# Patient Record
Sex: FEMALE | Race: WHITE | NOT HISPANIC OR LATINO | Employment: FULL TIME | URBAN - METROPOLITAN AREA
[De-identification: names, ages, dates, MRNs, and addresses within clinical notes are randomized per-mention and may not be internally consistent; named-entity substitution may affect disease eponyms.]

---

## 2020-01-27 ENCOUNTER — TELEPHONE (OUTPATIENT)
Dept: OBSTETRICS AND GYNECOLOGY | Facility: CLINIC | Age: 40
End: 2020-01-27

## 2020-01-27 DIAGNOSIS — Z34.90 PREGNANCY: Primary | ICD-10-CM

## 2020-01-27 NOTE — TELEPHONE ENCOUNTER
Voicemail message left for pt to call back and schedule appt.     ----- Message from Alphonse Golden sent at 1/27/2020  9:18 AM CST -----  Contact:  MARCIANO ALVARADO [[id number]]  Can the clinic reply in [facility]:      Who Called:  MARCIANO ALVARADO [[id number]]      Date of Positive Preg Test: 01/24/2020      1st day of Last Menstrual Cycle:  11/28/2019     List Any Difficulties: no     What Number to Call Back:  [phone] (home)

## 2020-01-30 ENCOUNTER — OFFICE VISIT (OUTPATIENT)
Dept: OBSTETRICS AND GYNECOLOGY | Facility: CLINIC | Age: 40
End: 2020-01-30
Payer: MEDICAID

## 2020-01-30 ENCOUNTER — PROCEDURE VISIT (OUTPATIENT)
Dept: OBSTETRICS AND GYNECOLOGY | Facility: CLINIC | Age: 40
End: 2020-01-30
Attending: EMERGENCY MEDICINE
Payer: MEDICAID

## 2020-01-30 VITALS
SYSTOLIC BLOOD PRESSURE: 122 MMHG | DIASTOLIC BLOOD PRESSURE: 84 MMHG | BODY MASS INDEX: 22.83 KG/M2 | HEIGHT: 69 IN | WEIGHT: 154.13 LBS

## 2020-01-30 DIAGNOSIS — F19.91 HISTORY OF DRUG USE: ICD-10-CM

## 2020-01-30 DIAGNOSIS — F19.91 HISTORY OF DRUG USE DISORDER: ICD-10-CM

## 2020-01-30 DIAGNOSIS — Z33.2 SECOND TRIMESTER ABORTION: ICD-10-CM

## 2020-01-30 DIAGNOSIS — Z32.00 POSSIBLE PREGNANCY, NOT YET CONFIRMED: ICD-10-CM

## 2020-01-30 DIAGNOSIS — Z36.89 ESTABLISH GESTATIONAL AGE, ULTRASOUND: ICD-10-CM

## 2020-01-30 DIAGNOSIS — Z32.00 POSSIBLE PREGNANCY, NOT YET CONFIRMED: Primary | ICD-10-CM

## 2020-01-30 DIAGNOSIS — O09.529 ANTEPARTUM MULTIGRAVIDA OF ADVANCED MATERNAL AGE: ICD-10-CM

## 2020-01-30 DIAGNOSIS — R31.9 URINARY TRACT INFECTION WITH HEMATURIA, SITE UNSPECIFIED: ICD-10-CM

## 2020-01-30 DIAGNOSIS — Z34.90 PREGNANCY, UNSPECIFIED GESTATIONAL AGE: ICD-10-CM

## 2020-01-30 DIAGNOSIS — N39.0 URINARY TRACT INFECTION WITH HEMATURIA, SITE UNSPECIFIED: ICD-10-CM

## 2020-01-30 DIAGNOSIS — Z3A.14 14 WEEKS GESTATION OF PREGNANCY: ICD-10-CM

## 2020-01-30 DIAGNOSIS — O99.311 ALCOHOL USE AFFECTING PREGNANCY IN FIRST TRIMESTER: ICD-10-CM

## 2020-01-30 PROBLEM — O99.310 ALCOHOL USE AFFECTING PREGNANCY: Status: ACTIVE | Noted: 2020-01-30

## 2020-01-30 LAB
AMPHET+METHAMPHET UR QL: NEGATIVE
B-HCG UR QL: POSITIVE
BARBITURATES UR QL SCN>200 NG/ML: NEGATIVE
BENZODIAZ UR QL SCN>200 NG/ML: NEGATIVE
BZE UR QL SCN: NEGATIVE
CANNABINOIDS UR QL SCN: NEGATIVE
CREAT UR-MCNC: 55 MG/DL (ref 15–325)
CTP QC/QA: YES
ETHANOL UR-MCNC: <10 MG/DL
METHADONE UR QL SCN>300 NG/ML: NEGATIVE
OPIATES UR QL SCN: NEGATIVE
PCP UR QL SCN>25 NG/ML: NEGATIVE
TOXICOLOGY INFORMATION: NORMAL

## 2020-01-30 PROCEDURE — 80307 DRUG TEST PRSMV CHEM ANLYZR: CPT

## 2020-01-30 PROCEDURE — 87591 N.GONORRHOEAE DNA AMP PROB: CPT

## 2020-01-30 PROCEDURE — 99999 PR PBB SHADOW E&M-EST. PATIENT-LVL III: ICD-10-PCS | Mod: PBBFAC,,, | Performed by: ADVANCED PRACTICE MIDWIFE

## 2020-01-30 PROCEDURE — 99204 OFFICE O/P NEW MOD 45 MIN: CPT | Mod: S$PBB,TH,, | Performed by: ADVANCED PRACTICE MIDWIFE

## 2020-01-30 PROCEDURE — 76816 PR  US,PREGNANT UTERUS,F/U,TRANSABD APP: ICD-10-PCS | Mod: 26,S$PBB,, | Performed by: OBSTETRICS & GYNECOLOGY

## 2020-01-30 PROCEDURE — 87086 URINE CULTURE/COLONY COUNT: CPT

## 2020-01-30 PROCEDURE — 87088 URINE BACTERIA CULTURE: CPT

## 2020-01-30 PROCEDURE — 99999 PR PBB SHADOW E&M-EST. PATIENT-LVL III: CPT | Mod: PBBFAC,,, | Performed by: ADVANCED PRACTICE MIDWIFE

## 2020-01-30 PROCEDURE — 99204 PR OFFICE/OUTPT VISIT, NEW, LEVL IV, 45-59 MIN: ICD-10-PCS | Mod: S$PBB,TH,, | Performed by: ADVANCED PRACTICE MIDWIFE

## 2020-01-30 PROCEDURE — 99213 OFFICE O/P EST LOW 20 MIN: CPT | Mod: PBBFAC,25,TH | Performed by: ADVANCED PRACTICE MIDWIFE

## 2020-01-30 PROCEDURE — 81025 URINE PREGNANCY TEST: CPT | Mod: PBBFAC | Performed by: ADVANCED PRACTICE MIDWIFE

## 2020-01-30 PROCEDURE — 76816 OB US FOLLOW-UP PER FETUS: CPT | Mod: 26,S$PBB,, | Performed by: OBSTETRICS & GYNECOLOGY

## 2020-01-30 PROCEDURE — 76816 OB US FOLLOW-UP PER FETUS: CPT | Mod: PBBFAC | Performed by: OBSTETRICS & GYNECOLOGY

## 2020-01-30 RX ORDER — HYDROXYZINE HYDROCHLORIDE 25 MG/1
25 TABLET, FILM COATED ORAL 3 TIMES DAILY
COMMUNITY
End: 2020-02-12 | Stop reason: SDUPTHER

## 2020-01-30 RX ORDER — NITROFURANTOIN 25; 75 MG/1; MG/1
100 CAPSULE ORAL 2 TIMES DAILY
Qty: 14 CAPSULE | Refills: 0 | Status: SHIPPED | OUTPATIENT
Start: 2020-01-30 | End: 2020-02-06

## 2020-01-31 LAB
C TRACH DNA SPEC QL NAA+PROBE: NOT DETECTED
N GONORRHOEA DNA SPEC QL NAA+PROBE: NOT DETECTED

## 2020-02-01 LAB — BACTERIA UR CULT: ABNORMAL

## 2020-02-09 DIAGNOSIS — Z32.00 POSSIBLE PREGNANCY, NOT YET CONFIRMED: Primary | ICD-10-CM

## 2020-02-11 ENCOUNTER — TELEPHONE (OUTPATIENT)
Dept: OBSTETRICS AND GYNECOLOGY | Facility: HOSPITAL | Age: 40
End: 2020-02-11

## 2020-02-11 DIAGNOSIS — F99 INSOMNIA DUE TO OTHER MENTAL DISORDER: Primary | ICD-10-CM

## 2020-02-11 DIAGNOSIS — F51.05 INSOMNIA DUE TO OTHER MENTAL DISORDER: Primary | ICD-10-CM

## 2020-02-11 PROBLEM — F41.0 PANIC ATTACK: Status: ACTIVE | Noted: 2020-01-26

## 2020-02-11 PROBLEM — F41.9 ANXIETY: Status: ACTIVE | Noted: 2020-02-11

## 2020-02-11 RX ORDER — HYDROXYZINE PAMOATE 25 MG/1
25 CAPSULE ORAL EVERY 4 HOURS PRN
Qty: 8 CAPSULE | Refills: 0 | Status: SHIPPED | OUTPATIENT
Start: 2020-02-11 | End: 2020-02-12 | Stop reason: SDUPTHER

## 2020-02-11 NOTE — TELEPHONE ENCOUNTER
"Called Sonal in response to a message from her requesting a refill of Vistaril. Called to ask about reason why she has been taking Vistaril. She states that on 1/26/20 she had a panic attack because she was having so much difficulty sleeping due to anxiety and she was exhausted. She went to an urgent care and was prescribed Vistaril (40 pills for 5 days). She states that she only took the pills at night to help her sleep, so instead of lasting 5 days they lasted her until yesterday. At the urgent care visit on 1/26/20 she found out that she is pregnant. According to dating ultrasound on 1/30/20 her MARBELLA is 7/24/20 (making her 16w4d today).    She states that she has been experiencing anxiety since her friend committed suicide on August 28th, 2019. She states that she lost her job a few days after that, and that compounded her anxiety. She was not taking any anxiety medications until her urgent care visit on 1/26/20.    She states that she does not feel very anxious during the day, but at night time she feels anxious and therefore has difficulty falling asleep. When she has difficulty falling asleep, she starts to feel anxious about not getting enough sleep, and that makes her anxiety worse, which makes it harder to fall asleep. She denies suicidal ideation and denies homicidal ideation. When asked about SI/HI, she exclaimed, "Thank you for asking, I know you have to ask that, but NO! I would NEVER think of doing that!"     Recommended for patient to come in for clinic visit tomorrow. She states that at her first OB visit she was in a rush and had to leave before all information was covered. Tomorrow she would like to discuss genetic testing options, new OB bloodwork, and ways to help her sleep/decrease anxiety. She states that she will be here at 2:40pm with her  for her clinic visit tomorrow.    She will be out of town from February 21st-28th, and requests that no appointments be scheduled that week.    I told " her that I would prescribe more Vistaril so that it can help her sleep tonight, and will see her in the clinic tomorrow.

## 2020-02-12 ENCOUNTER — INITIAL PRENATAL (OUTPATIENT)
Dept: OBSTETRICS AND GYNECOLOGY | Facility: CLINIC | Age: 40
End: 2020-02-12
Payer: MEDICAID

## 2020-02-12 ENCOUNTER — HOSPITAL ENCOUNTER (OUTPATIENT)
Dept: RADIOLOGY | Facility: OTHER | Age: 40
Discharge: HOME | End: 2020-02-12
Attending: ADVANCED PRACTICE MIDWIFE
Payer: MEDICAID

## 2020-02-12 ENCOUNTER — PATIENT MESSAGE (OUTPATIENT)
Dept: OBSTETRICS AND GYNECOLOGY | Facility: CLINIC | Age: 40
End: 2020-02-12

## 2020-02-12 VITALS — WEIGHT: 156.5 LBS | BODY MASS INDEX: 23.11 KG/M2 | SYSTOLIC BLOOD PRESSURE: 114 MMHG | DIASTOLIC BLOOD PRESSURE: 78 MMHG

## 2020-02-12 DIAGNOSIS — Z34.90 PREGNANCY, UNSPECIFIED GESTATIONAL AGE: ICD-10-CM

## 2020-02-12 DIAGNOSIS — Z13.9 RISK AND FUNCTIONAL ASSESSMENT: ICD-10-CM

## 2020-02-12 DIAGNOSIS — E04.9 GOITER: ICD-10-CM

## 2020-02-12 DIAGNOSIS — Z34.90 PREGNANCY, UNSPECIFIED GESTATIONAL AGE: Primary | ICD-10-CM

## 2020-02-12 DIAGNOSIS — G47.00 INSOMNIA, UNSPECIFIED TYPE: ICD-10-CM

## 2020-02-12 DIAGNOSIS — Z32.00 POSSIBLE PREGNANCY, NOT YET CONFIRMED: ICD-10-CM

## 2020-02-12 DIAGNOSIS — F41.9 ANXIETY: ICD-10-CM

## 2020-02-12 PROCEDURE — 88175 CYTOPATH C/V AUTO FLUID REDO: CPT

## 2020-02-12 PROCEDURE — 76536 US EXAM OF HEAD AND NECK: CPT | Mod: 26,,, | Performed by: RADIOLOGY

## 2020-02-12 PROCEDURE — 99203 PR OFFICE/OUTPT VISIT, NEW, LEVL III, 30-44 MIN: ICD-10-PCS | Mod: TH,S$PBB,, | Performed by: ADVANCED PRACTICE MIDWIFE

## 2020-02-12 PROCEDURE — 76536 US EXAM OF HEAD AND NECK: CPT | Mod: TC

## 2020-02-12 PROCEDURE — 99212 OFFICE O/P EST SF 10 MIN: CPT | Mod: PBBFAC,25,TH | Performed by: ADVANCED PRACTICE MIDWIFE

## 2020-02-12 PROCEDURE — 76536 US THYROID: ICD-10-PCS | Mod: 26,,, | Performed by: RADIOLOGY

## 2020-02-12 PROCEDURE — 99999 PR PBB SHADOW E&M-EST. PATIENT-LVL II: ICD-10-PCS | Mod: PBBFAC,,, | Performed by: ADVANCED PRACTICE MIDWIFE

## 2020-02-12 PROCEDURE — 87624 HPV HI-RISK TYP POOLED RSLT: CPT

## 2020-02-12 PROCEDURE — 87086 URINE CULTURE/COLONY COUNT: CPT

## 2020-02-12 PROCEDURE — 99999 PR PBB SHADOW E&M-EST. PATIENT-LVL II: CPT | Mod: PBBFAC,,, | Performed by: ADVANCED PRACTICE MIDWIFE

## 2020-02-12 PROCEDURE — 99203 OFFICE O/P NEW LOW 30 MIN: CPT | Mod: TH,S$PBB,, | Performed by: ADVANCED PRACTICE MIDWIFE

## 2020-02-12 RX ORDER — HYDROXYZINE PAMOATE 50 MG/1
50 CAPSULE ORAL NIGHTLY PRN
Qty: 40 CAPSULE | Refills: 0 | Status: SHIPPED | OUTPATIENT
Start: 2020-02-12 | End: 2020-02-12

## 2020-02-12 RX ORDER — HYDROXYZINE PAMOATE 25 MG/1
25 CAPSULE ORAL NIGHTLY PRN
Qty: 40 CAPSULE | Refills: 0 | Status: SHIPPED | OUTPATIENT
Start: 2020-02-12 | End: 2020-03-23

## 2020-02-13 ENCOUNTER — LAB VISIT (OUTPATIENT)
Dept: LAB | Facility: OTHER | Age: 40
End: 2020-02-13
Attending: ADVANCED PRACTICE MIDWIFE
Payer: MEDICAID

## 2020-02-13 ENCOUNTER — TELEPHONE (OUTPATIENT)
Dept: OBSTETRICS AND GYNECOLOGY | Facility: CLINIC | Age: 40
End: 2020-02-13

## 2020-02-13 DIAGNOSIS — Z34.90 PREGNANCY, UNSPECIFIED GESTATIONAL AGE: ICD-10-CM

## 2020-02-13 LAB
ABO + RH BLD: NORMAL
ALBUMIN SERPL BCP-MCNC: 3 G/DL (ref 3.5–5.2)
ALP SERPL-CCNC: 58 U/L (ref 55–135)
ALT SERPL W/O P-5'-P-CCNC: 13 U/L (ref 10–44)
ANION GAP SERPL CALC-SCNC: 7 MMOL/L (ref 8–16)
AST SERPL-CCNC: 18 U/L (ref 10–40)
BACTERIA UR CULT: NO GROWTH
BASOPHILS # BLD AUTO: 0.04 K/UL (ref 0–0.2)
BASOPHILS NFR BLD: 0.6 % (ref 0–1.9)
BILIRUB SERPL-MCNC: 0.4 MG/DL (ref 0.1–1)
BLD GP AB SCN CELLS X3 SERPL QL: NORMAL
BUN SERPL-MCNC: 6 MG/DL (ref 6–20)
CALCIUM SERPL-MCNC: 9.5 MG/DL (ref 8.7–10.5)
CHLORIDE SERPL-SCNC: 107 MMOL/L (ref 95–110)
CO2 SERPL-SCNC: 23 MMOL/L (ref 23–29)
CREAT SERPL-MCNC: 0.7 MG/DL (ref 0.5–1.4)
DIFFERENTIAL METHOD: ABNORMAL
EOSINOPHIL # BLD AUTO: 0.1 K/UL (ref 0–0.5)
EOSINOPHIL NFR BLD: 1.6 % (ref 0–8)
ERYTHROCYTE [DISTWIDTH] IN BLOOD BY AUTOMATED COUNT: 11.2 % (ref 11.5–14.5)
EST. GFR  (AFRICAN AMERICAN): >60 ML/MIN/1.73 M^2
EST. GFR  (NON AFRICAN AMERICAN): >60 ML/MIN/1.73 M^2
GLUCOSE SERPL-MCNC: 80 MG/DL (ref 70–110)
HCT VFR BLD AUTO: 35 % (ref 37–48.5)
HGB BLD-MCNC: 11.2 G/DL (ref 12–16)
IMM GRANULOCYTES # BLD AUTO: 0.02 K/UL (ref 0–0.04)
IMM GRANULOCYTES NFR BLD AUTO: 0.3 % (ref 0–0.5)
LYMPHOCYTES # BLD AUTO: 1.4 K/UL (ref 1–4.8)
LYMPHOCYTES NFR BLD: 19.6 % (ref 18–48)
MCH RBC QN AUTO: 31.4 PG (ref 27–31)
MCHC RBC AUTO-ENTMCNC: 32 G/DL (ref 32–36)
MCV RBC AUTO: 98 FL (ref 82–98)
MONOCYTES # BLD AUTO: 0.5 K/UL (ref 0.3–1)
MONOCYTES NFR BLD: 7.4 % (ref 4–15)
NEUTROPHILS # BLD AUTO: 4.9 K/UL (ref 1.8–7.7)
NEUTROPHILS NFR BLD: 70.5 % (ref 38–73)
NRBC BLD-RTO: 0 /100 WBC
PLATELET # BLD AUTO: 338 K/UL (ref 150–350)
PMV BLD AUTO: 9.8 FL (ref 9.2–12.9)
POTASSIUM SERPL-SCNC: 3.8 MMOL/L (ref 3.5–5.1)
PROT SERPL-MCNC: 6.7 G/DL (ref 6–8.4)
RBC # BLD AUTO: 3.57 M/UL (ref 4–5.4)
SODIUM SERPL-SCNC: 137 MMOL/L (ref 136–145)
WBC # BLD AUTO: 7 K/UL (ref 3.9–12.7)

## 2020-02-13 PROCEDURE — 85025 COMPLETE CBC W/AUTO DIFF WBC: CPT

## 2020-02-13 PROCEDURE — 86592 SYPHILIS TEST NON-TREP QUAL: CPT

## 2020-02-13 PROCEDURE — 80053 COMPREHEN METABOLIC PANEL: CPT

## 2020-02-13 PROCEDURE — 86900 BLOOD TYPING SEROLOGIC ABO: CPT

## 2020-02-13 PROCEDURE — 86762 RUBELLA ANTIBODY: CPT

## 2020-02-13 PROCEDURE — 87340 HEPATITIS B SURFACE AG IA: CPT

## 2020-02-13 PROCEDURE — 86703 HIV-1/HIV-2 1 RESULT ANTBDY: CPT

## 2020-02-13 NOTE — TELEPHONE ENCOUNTER
Returned call to pt.  Left  message advising pt to return call to clinic        ----- Message from Altagracia Cano sent at 2/13/2020  9:27 AM CST -----  Contact: MARCIANO ALVARADO   Name of Who is Calling: MARCIANO ALVARADO        What is the request in detail: Patient is requesting a call from staff in regards to getting orders for bloodwork (LAB HAD TROUBLE PRINTING LABELS).....Please contact to further discuss and advise.     Can the clinic reply by [facility]: NO     What Number [facility] Back [facility]: [phone]

## 2020-02-14 LAB
HBV SURFACE AG SERPL QL IA: NEGATIVE
HIV 1+2 AB+HIV1 P24 AG SERPL QL IA: NEGATIVE
RPR SER QL: NORMAL
RUBV IGG SER-ACNC: 10.3 IU/ML
RUBV IGG SER-IMP: REACTIVE

## 2020-02-14 NOTE — PROGRESS NOTES
39 y.o.,  at 16w5d by 14 wk US    Complaints today: She has been having difficulty sleeping due to anxiety. She was prescribed Atarax in January at an urgent care after she had a panic attack (which she states was due to exhaustion). She states that the Atarax has been very helpful in allowing her to sleep. She is requesting a refill.     Feeling flutters/fetal movement.    Patient's last pap was about 1 year ago at an outside facility: result negative cytology, positive HPV. Will repeat pap today.    TW lbs     BMI  -- Discussed IOM recommended weight gain of:   Normal Weight 18.5-24.9  25-35   -- Discussed criteria for delivery at [facility] r/t excessive pre-preg weight or excessive weight gain:   Pre-pregnancy BMI over 40 or excess pregnancy weight gain defined as:   Pre-preg BMI 18.5-24.9;  Excess weight gain = > 53 pounds    ROS  OBSTETRICS:   Contractions No   Bleeding No   Loss of fluid No    GASTRO:   Nausea No   Vomiting No      OB History    Para Term  AB Living   5 1 1 0 2 1   SAB TAB Ectopic Multiple Live Births   1 0 0 0        # Outcome Date GA Lbr Ponce/2nd Weight Sex Delivery Anes PTL Lv   5 Current            4             3 AB            2 SAB            1 Term                Dating reviewed  Allergies and problem list reviewed and updated  Medical and surgical history reviewed  Prenatal labs reviewed and updated    PHYSICAL EXAM  /78   Wt 71 kg (156 lb 8.4 oz)   LMP 2019 (Within Weeks)   BMI 23.11 kg/m²     ROS:  Constitutional/Gen: Denies fevers, chills, malaise, or weight loss.   Psych: Denies depression, positive anxiety (patient states she is fine during the day, but anxiety keeps her awake at night if she does not have Atarax or Vistaril)  Eyes: Denies changes in vision or scotomata  Ears, nose, mouth, throat: Denies sinus tenderness, swelling, or dentition problems  CV/vasc: Denies heart palpitations or edema  Resp: Denies SOB or dyspnea  Breasts:  Denies mass, nipple discharge, or trauma.  GI: Denies constipation, diarrhea, or vomiting.  : Denies vaginal discharge, dysuria or pelvic pain.  MS: Denies weakness, soreness, or changes in ROM    OBJECTIVE:  /78   Wt 71 kg (156 lb 8.4 oz)   LMP 11/28/2019 (Within Weeks)   BMI 23.11 kg/m²   Constitutional/Gen: NAD, appears stated age, well groomed  Neck: supple, no masses, thyroid enlarged  Head: normocephalic  Skin: warm and dry w/o rash  Lung: normal resp effort, CTAB  Heart: normal HR, RRR   Back: negative CVAT  Breasts: bilaterally--no masses, tenderness, skin changes, or nipple discharge noted  Abdomen: soft, nontender, no masses, and bowel sounds normal, no enlargement  External genitalia: no lesions or discharge, normal hair distribution  Urethral meatus: normal size and location, no lesions or prolapse  Vagina: normal appearance, no lesions, no discharge, no evidence cystocele or rectocele.  Cervix: normal appearance, no discharge, no lesions, negative CMT  Uterus: nontender, mobile, approx 16 week size, contour, and position. FHTs via doppler 150bpm  Adnexa: no masses or tenderness  Anus/Perineum: normal appearance, with no lesions or discharge. Internal exam deferred.  Extremities: FROM, with no edema or tenderness.  Neurologic: A&O x 4, non-focal, cranial nerves 2-12 grossly intact  Psych: affect mildly anxious and without signs of mood, thought or memory difficulty appreciated. Patient denies suicidal or homicidal ideations.     ASSESSMENT AND PLAN    MARCIANO ALVARADO Problems (from 02/12/20 to present)     No problems associated with this episode.            Pt plans to complete [id number] today. Education regarding AFP screening today and pt accepts.    Anatomy ultrasound with MFM ordered/discussed.    Diagnoses and all orders for this visit:    Pregnancy, unspecified gestational age  -     Maternal Screen AFP (Single Marker); Future  -     TSH; Future  -     T4, free; Future  -     US Thyroid;  Future  -     Liquid-Based Pap Smear, Screening  -     HPV High Risk Genotypes, PCR  -     Urine culture  -     CBC auto differential; Future  -     Rubella Antibody, IgG; Future  -     Type & Screen; Future  -     Hepatitis B surface antigen; Future  -     RPR; Future  -     HIV 1/2 Ag/Ab (4th Gen); Future  -     COMPREHENSIVE METABOLIC PANEL; Future  -     US MFM Procedure (Viewpoint); Future    Goiter  -     TSH; Future  -     T4, free; Future  -     US Thyroid; Future    Anxiety  -     hydrOXYzine pamoate (VISTARIL) 25 MG Cap; Take 1 capsule (25 mg total) by mouth nightly as needed (For anxiety and difficulty sleeping).  - Patient denies suicidal or homicidal ideation. Confirmed that she has 24 hour CNM number and she agrees to call that or 911 immediately if needed.  - Patient states that anxiety is only a problem at night, and keeps her awake at night, but that vistaril has been working for her.  - Encouraged Sonal to seek continuous care with a psychiatric provider so she can be treated for anxiety and eventually wean off Vistaril. Sent list of psychiatric facilities that accept Medicaid. Patient agrees to establish care with a psychiatric provider and states that she sees the value in this.    Insomnia, unspecified type  -     hydrOXYzine pamoate (VISTARIL) 25 MG Cap; Take 1 capsule (25 mg total) by mouth nightly as needed (For anxiety and difficulty sleeping).    Birth Center Risk Assessment: 2 - Consultation with OB to develop plan of care (alcohol use in early preg before being aware of pregnancy)    Reviewed warning signs and pregnancy precautions, along with how/when to call. Confirmed that she has 24 hour CNM number and she agrees to call that or 911 immediately if needed.    Follow up: 4 wks, call or present sooner prn.

## 2020-02-15 PROBLEM — E04.9 GOITER: Status: RESOLVED | Noted: 2020-02-12 | Resolved: 2020-02-15

## 2020-02-15 PROBLEM — Z13.9 RISK AND FUNCTIONAL ASSESSMENT: Status: ACTIVE | Noted: 2020-02-15

## 2020-02-17 ENCOUNTER — PATIENT MESSAGE (OUTPATIENT)
Dept: MATERNAL FETAL MEDICINE | Facility: CLINIC | Age: 40
End: 2020-02-17

## 2020-02-18 ENCOUNTER — PATIENT MESSAGE (OUTPATIENT)
Dept: OBSTETRICS AND GYNECOLOGY | Facility: CLINIC | Age: 40
End: 2020-02-18

## 2020-02-19 LAB
HPV HR 12 DNA SPEC QL NAA+PROBE: POSITIVE
HPV16 AG SPEC QL: NEGATIVE
HPV18 DNA SPEC QL NAA+PROBE: NEGATIVE

## 2020-02-25 PROBLEM — E04.1 CYSTIC THYROID NODULE: Status: ACTIVE | Noted: 2020-02-12

## 2020-03-03 ENCOUNTER — DOCUMENTATION ONLY (OUTPATIENT)
Dept: OBSTETRICS AND GYNECOLOGY | Facility: CLINIC | Age: 40
End: 2020-03-03

## 2020-03-03 NOTE — PROGRESS NOTES
Called pt to provide [id number] test results      Result:  Negative   Pt request to know fetal sex:  Female

## 2020-03-04 ENCOUNTER — PROCEDURE VISIT (OUTPATIENT)
Dept: MATERNAL FETAL MEDICINE | Facility: CLINIC | Age: 40
End: 2020-03-04
Payer: MEDICAID

## 2020-03-04 ENCOUNTER — INITIAL CONSULT (OUTPATIENT)
Dept: MATERNAL FETAL MEDICINE | Facility: CLINIC | Age: 40
End: 2020-03-04
Attending: OBSTETRICS & GYNECOLOGY
Payer: MEDICAID

## 2020-03-04 VITALS
WEIGHT: 162.94 LBS | HEIGHT: 69 IN | BODY MASS INDEX: 24.13 KG/M2 | DIASTOLIC BLOOD PRESSURE: 60 MMHG | SYSTOLIC BLOOD PRESSURE: 100 MMHG

## 2020-03-04 DIAGNOSIS — Z36.89 ENCOUNTER FOR ULTRASOUND TO CHECK FETAL GROWTH: ICD-10-CM

## 2020-03-04 DIAGNOSIS — Z36.4 ANTENATAL SCREENING FOR FETAL GROWTH RETARDATION USING ULTRASONICS: ICD-10-CM

## 2020-03-04 DIAGNOSIS — O09.522 MULTIGRAVIDA OF ADVANCED MATERNAL AGE IN SECOND TRIMESTER: ICD-10-CM

## 2020-03-04 DIAGNOSIS — O09.529 ANTEPARTUM MULTIGRAVIDA OF ADVANCED MATERNAL AGE: ICD-10-CM

## 2020-03-04 DIAGNOSIS — Z3A.19 19 WEEKS GESTATION OF PREGNANCY: ICD-10-CM

## 2020-03-04 DIAGNOSIS — Z3A.14 14 WEEKS GESTATION OF PREGNANCY: ICD-10-CM

## 2020-03-04 DIAGNOSIS — O99.311 ALCOHOL USE AFFECTING PREGNANCY IN FIRST TRIMESTER: ICD-10-CM

## 2020-03-04 DIAGNOSIS — O99.312 ALCOHOL USE AFFECTING PREGNANCY IN SECOND TRIMESTER: ICD-10-CM

## 2020-03-04 DIAGNOSIS — Z36.3 ANTENATAL SCREENING FOR MALFORMATION USING ULTRASONICS: ICD-10-CM

## 2020-03-04 PROCEDURE — 76811 OB US DETAILED SNGL FETUS: CPT | Mod: 26,S$PBB,, | Performed by: OBSTETRICS & GYNECOLOGY

## 2020-03-04 PROCEDURE — 76811 OB US DETAILED SNGL FETUS: CPT | Mod: PBBFAC | Performed by: OBSTETRICS & GYNECOLOGY

## 2020-03-04 PROCEDURE — 99204 PR OFFICE/OUTPT VISIT, NEW, LEVL IV, 45-59 MIN: ICD-10-PCS | Mod: 25,S$PBB,TH, | Performed by: OBSTETRICS & GYNECOLOGY

## 2020-03-04 PROCEDURE — 99204 OFFICE O/P NEW MOD 45 MIN: CPT | Mod: 25,S$PBB,TH, | Performed by: OBSTETRICS & GYNECOLOGY

## 2020-03-04 PROCEDURE — 99999 PR PBB SHADOW E&M-EST. PATIENT-LVL II: ICD-10-PCS | Mod: PBBFAC,,, | Performed by: OBSTETRICS & GYNECOLOGY

## 2020-03-04 PROCEDURE — 99999 PR PBB SHADOW E&M-EST. PATIENT-LVL II: CPT | Mod: PBBFAC,,, | Performed by: OBSTETRICS & GYNECOLOGY

## 2020-03-04 PROCEDURE — 76811 PR US, OB FETAL EVAL & EXAM, TRANSABDOM,FIRST GESTATION: ICD-10-PCS | Mod: 26,S$PBB,, | Performed by: OBSTETRICS & GYNECOLOGY

## 2020-03-04 NOTE — LETTER
March 6, 2020      Ashly Piper CNM  [address]  [facility] [address]           [facility] [address]  [address]  [facility] [address]  Phone: [phone]          Patient: Sonal Simms   MR Number: [id number]   YOB: 1980   Date of Visit: 3/4/2020       Dear Ashly Piper:    Thank you for referring Sonal Simms to me for evaluation. Attached you will find relevant portions of my assessment and plan of care.    If you have questions, please do not hesitate to call me. I look forward to following Sonal Simms along with you.    Sincerely,    Monica Rivas MD    Enclosure  CC:  No Recipients    If you would like to receive this communication electronically, please contact [email or web address] or [phone] to request more information on [facility] access.    For providers and/or their staff who would like to refer a patient to [facility], please contact us through our one-stop-shop provider referral line, [facility], at [phone].    If you feel you have received this communication in error or would no longer like to receive these types of communications, please e-mail [email or web address]

## 2020-03-08 PROBLEM — O99.312 ALCOHOL USE AFFECTING PREGNANCY IN SECOND TRIMESTER: Status: ACTIVE | Noted: 2020-01-30

## 2020-03-10 ENCOUNTER — PATIENT MESSAGE (OUTPATIENT)
Dept: OBSTETRICS AND GYNECOLOGY | Facility: CLINIC | Age: 40
End: 2020-03-10

## 2020-03-11 ENCOUNTER — ROUTINE PRENATAL (OUTPATIENT)
Dept: OBSTETRICS AND GYNECOLOGY | Facility: CLINIC | Age: 40
End: 2020-03-11
Payer: MEDICAID

## 2020-03-11 VITALS
BODY MASS INDEX: 24.03 KG/M2 | WEIGHT: 162.69 LBS | DIASTOLIC BLOOD PRESSURE: 80 MMHG | SYSTOLIC BLOOD PRESSURE: 116 MMHG

## 2020-03-11 DIAGNOSIS — Z34.90 PREGNANCY, UNSPECIFIED GESTATIONAL AGE: ICD-10-CM

## 2020-03-11 DIAGNOSIS — Z3A.20 20 WEEKS GESTATION OF PREGNANCY: ICD-10-CM

## 2020-03-11 DIAGNOSIS — O44.41: Primary | ICD-10-CM

## 2020-03-11 PROCEDURE — 99213 OFFICE O/P EST LOW 20 MIN: CPT | Mod: TH,S$PBB,, | Performed by: MIDWIFE

## 2020-03-11 PROCEDURE — 99999 PR PBB SHADOW E&M-EST. PATIENT-LVL II: CPT | Mod: PBBFAC,,, | Performed by: MIDWIFE

## 2020-03-11 PROCEDURE — 99213 PR OFFICE/OUTPT VISIT, EST, LEVL III, 20-29 MIN: ICD-10-PCS | Mod: TH,S$PBB,, | Performed by: MIDWIFE

## 2020-03-11 PROCEDURE — 99212 OFFICE O/P EST SF 10 MIN: CPT | Mod: PBBFAC,TH | Performed by: MIDWIFE

## 2020-03-11 PROCEDURE — 99999 PR PBB SHADOW E&M-EST. PATIENT-LVL II: ICD-10-PCS | Mod: PBBFAC,,, | Performed by: MIDWIFE

## 2020-03-11 NOTE — PROGRESS NOTES
Chief Complaint   Patient presents with    Routine Prenatal Visit       39 y.o., at 20w5d by Estimated Date of Delivery: 20    Complaints today: none. Doing well.  TW lbs    ROS  OBSTETRICS:   Contractions No   Bleeding No   Loss of fluid No   Fetal mvmnt yes  GASTRO:   Nausea No   Vomiting No      OB History    Para Term  AB Living   5 1 1 0 2 1   SAB TAB Ectopic Multiple Live Births   1 0 0 0        # Outcome Date GA Lbr Ponce/2nd Weight Sex Delivery Anes PTL Lv   5 Current            4             3 AB            2 SAB            1 Term                Dating reviewed  Allergies and problem list reviewed and updated  Medical and surgical history reviewed  Prenatal labs reviewed and updated    PHYSICAL EXAM  /80   Wt 73.8 kg (162 lb 11.2 oz)   LMP 2019 (Within Weeks)   BMI 24.03 kg/m²     GENERAL: No acute distress  HEENT: Normocephalic, atraumatic  NEURO: Alert and oriented x3  PSYCH: Normal mood and affect  PULMONARY: Non-labored respiration; no tachypnea  ABD: Soft, gravid, nontender; no hernia or hepatosplenomegaly  FH = umbilicus    ASSESSMENT AND PLAN    MARCIANO ALVARADO Problems (from 20 to present)     Problem Noted Resolved    Birth Center Risk Assessment: 2 - Consultation with OB to develop plan of care (alcohol use in early preg before being aware of pregnancy) 2/15/2020 by Precious Meyer CNM No    Overview Signed 2/15/2020 11:27 AM by Precious Meyer CNM     Birth Center Risk Assessment: 2 - Consultation with OB to develop plan of care (alcohol use in early preg before being aware of pregnancy)    0- CNM management in ABC  1- CNM management on L&D  2- Consultation with OB to develop  plan of care  3- Collaborative CNM/OB management with delivery on L&D  4- Permanent referral of care to MD           Alcohol use affecting pregnanccy--possible heavy use in early pregnancy: NO CURRENT use 2020 by Ashly Piper CNM No    Overview Addendum  3/8/2020  6:20 PM by Ashly Piper CNM     -See MFM notes:  1. Recommend a f/u ultrasound in about 5 wks to complete fetal anatomical survey, interval growth, and amniotic fluid volume assessment  2. Recommend f/u ultrasound at about 30-32 wks gestation for interval fetal growth, amniotic fluid volume, and Placental location with assessment of blood vessels in lower  uterine segment/cervix area:  -- Primary OB to insure appt scheduled  -- recommend subsequent ultrasound about 4-6 wks for interval fetal growth, amniotic fluid volume  3. Bleeding precautions given to patient  4. Patient did not report continued use of alcohol after the first trimester  5. Recommend Pediatrician be notified of patient's alcohol use in first trimester for management/monitoring of FAS per their recommendations/guidelines                     Reviewed anatomy ultrasound.Discussed low-lying placenta, s/s to report.  Reviewed fetal growth.  Reviewed wt gain parameters for ABC, along with exercise/diet recommendations in pregnancy.  Encouraged prenatal education classes - schedule given.  Education regarding  labor warning s/s.      Reviewed warning signs, normal FM, and how/when to call.    Follow-up: 4 weeks, call or present sooner PRN

## 2020-03-13 ENCOUNTER — TELEPHONE (OUTPATIENT)
Dept: OBSTETRICS AND GYNECOLOGY | Facility: CLINIC | Age: 40
End: 2020-03-13

## 2020-03-13 LAB
FINAL PATHOLOGIC DIAGNOSIS: NORMAL
Lab: NORMAL

## 2020-03-27 DIAGNOSIS — Z34.93 PRENATAL CARE IN THIRD TRIMESTER: Primary | ICD-10-CM

## 2020-04-28 ENCOUNTER — PROCEDURE VISIT (OUTPATIENT)
Dept: MATERNAL FETAL MEDICINE | Facility: CLINIC | Age: 40
End: 2020-04-28
Payer: MEDICAID

## 2020-04-28 ENCOUNTER — ROUTINE PRENATAL (OUTPATIENT)
Dept: OBSTETRICS AND GYNECOLOGY | Facility: CLINIC | Age: 40
End: 2020-04-28
Payer: MEDICAID

## 2020-04-28 ENCOUNTER — LAB VISIT (OUTPATIENT)
Dept: LAB | Facility: OTHER | Age: 40
End: 2020-04-28
Attending: ADVANCED PRACTICE MIDWIFE
Payer: MEDICAID

## 2020-04-28 VITALS
SYSTOLIC BLOOD PRESSURE: 102 MMHG | DIASTOLIC BLOOD PRESSURE: 78 MMHG | BODY MASS INDEX: 25.72 KG/M2 | WEIGHT: 174.19 LBS

## 2020-04-28 DIAGNOSIS — O44.40 LOW-LYING PLACENTA: ICD-10-CM

## 2020-04-28 DIAGNOSIS — O99.311 ALCOHOL USE AFFECTING PREGNANCY IN FIRST TRIMESTER: ICD-10-CM

## 2020-04-28 DIAGNOSIS — Z36.89 ENCOUNTER FOR ULTRASOUND TO ASSESS FETAL GROWTH: Primary | ICD-10-CM

## 2020-04-28 DIAGNOSIS — Z34.92 PRENATAL CARE IN SECOND TRIMESTER: ICD-10-CM

## 2020-04-28 DIAGNOSIS — O09.523 ADVANCED MATERNAL AGE IN MULTIGRAVIDA, THIRD TRIMESTER: ICD-10-CM

## 2020-04-28 DIAGNOSIS — Z34.93 PRENATAL CARE IN THIRD TRIMESTER: ICD-10-CM

## 2020-04-28 DIAGNOSIS — Z3A.27 27 WEEKS GESTATION OF PREGNANCY: Primary | ICD-10-CM

## 2020-04-28 LAB
BASOPHILS # BLD AUTO: 0.04 K/UL (ref 0–0.2)
BASOPHILS NFR BLD: 0.4 % (ref 0–1.9)
DIFFERENTIAL METHOD: ABNORMAL
EOSINOPHIL # BLD AUTO: 0.2 K/UL (ref 0–0.5)
EOSINOPHIL NFR BLD: 1.9 % (ref 0–8)
ERYTHROCYTE [DISTWIDTH] IN BLOOD BY AUTOMATED COUNT: 12.1 % (ref 11.5–14.5)
GLUCOSE SERPL-MCNC: 127 MG/DL (ref 70–140)
HCT VFR BLD AUTO: 35.9 % (ref 37–48.5)
HGB BLD-MCNC: 11.5 G/DL (ref 12–16)
IMM GRANULOCYTES # BLD AUTO: 0.05 K/UL (ref 0–0.04)
IMM GRANULOCYTES NFR BLD AUTO: 0.5 % (ref 0–0.5)
LYMPHOCYTES # BLD AUTO: 1.9 K/UL (ref 1–4.8)
LYMPHOCYTES NFR BLD: 19.2 % (ref 18–48)
MCH RBC QN AUTO: 29.4 PG (ref 27–31)
MCHC RBC AUTO-ENTMCNC: 32 G/DL (ref 32–36)
MCV RBC AUTO: 92 FL (ref 82–98)
MONOCYTES # BLD AUTO: 0.7 K/UL (ref 0.3–1)
MONOCYTES NFR BLD: 7.1 % (ref 4–15)
NEUTROPHILS # BLD AUTO: 6.9 K/UL (ref 1.8–7.7)
NEUTROPHILS NFR BLD: 70.9 % (ref 38–73)
NRBC BLD-RTO: 0 /100 WBC
PLATELET # BLD AUTO: 281 K/UL (ref 150–350)
PMV BLD AUTO: 9.5 FL (ref 9.2–12.9)
RBC # BLD AUTO: 3.91 M/UL (ref 4–5.4)
WBC # BLD AUTO: 9.7 K/UL (ref 3.9–12.7)

## 2020-04-28 PROCEDURE — 85025 COMPLETE CBC W/AUTO DIFF WBC: CPT

## 2020-04-28 PROCEDURE — 99999 PR PBB SHADOW E&M-EST. PATIENT-LVL III: CPT | Mod: PBBFAC,,, | Performed by: NURSE PRACTITIONER

## 2020-04-28 PROCEDURE — 76816 PR  US,PREGNANT UTERUS,F/U,TRANSABD APP: ICD-10-PCS | Mod: 26,S$PBB,, | Performed by: OBSTETRICS & GYNECOLOGY

## 2020-04-28 PROCEDURE — 99213 PR OFFICE/OUTPT VISIT, EST, LEVL III, 20-29 MIN: ICD-10-PCS | Mod: TH,S$PBB,, | Performed by: NURSE PRACTITIONER

## 2020-04-28 PROCEDURE — 82950 GLUCOSE TEST: CPT

## 2020-04-28 PROCEDURE — 76816 OB US FOLLOW-UP PER FETUS: CPT | Mod: 26,S$PBB,, | Performed by: OBSTETRICS & GYNECOLOGY

## 2020-04-28 PROCEDURE — 99999 PR PBB SHADOW E&M-EST. PATIENT-LVL III: ICD-10-PCS | Mod: PBBFAC,,, | Performed by: NURSE PRACTITIONER

## 2020-04-28 PROCEDURE — 36415 COLL VENOUS BLD VENIPUNCTURE: CPT

## 2020-04-28 PROCEDURE — 99213 OFFICE O/P EST LOW 20 MIN: CPT | Mod: PBBFAC,25,TH | Performed by: NURSE PRACTITIONER

## 2020-04-28 PROCEDURE — 99213 OFFICE O/P EST LOW 20 MIN: CPT | Mod: TH,S$PBB,, | Performed by: NURSE PRACTITIONER

## 2020-04-28 PROCEDURE — 76816 OB US FOLLOW-UP PER FETUS: CPT | Mod: PBBFAC | Performed by: OBSTETRICS & GYNECOLOGY

## 2020-04-28 RX ORDER — HYDROXYZINE PAMOATE 50 MG/1
CAPSULE ORAL
COMMUNITY
Start: 2020-04-16 | End: 2020-06-17 | Stop reason: SDUPTHER

## 2020-05-05 NOTE — PROGRESS NOTES
39 y.o. female  at 28w4d   Reports + FM, denies VB, LOF or CTX  Doing well - concerned about low-lying placenta and vaginal delivery. Discussed this with pt and she has f/u US for growth and to check on placenta scheduled @ 32wk  TW lbs   28wk labs today (O POS)  Tdap - will think about it  Reviewed warning signs, normal FKCs,  labor precautions and how/when to call.  RTC x 2 wks, call or present sooner prn.     Birth Center Risk Assessment: 2 - consultation with OB to develop plan of care - 2/2 alcohol use in 1st trimester; low lying placenta    0- CNM management in ABC  1- CNM management on L&D  2- Consultation with OB to develop  plan of care  3- Collaborative CNM/OB management with delivery on L&D  4- Permanent referral of care to MD

## 2020-05-12 ENCOUNTER — PATIENT MESSAGE (OUTPATIENT)
Dept: OBSTETRICS AND GYNECOLOGY | Facility: CLINIC | Age: 40
End: 2020-05-12

## 2020-05-12 ENCOUNTER — TELEPHONE (OUTPATIENT)
Dept: EMERGENCY MEDICINE | Facility: OTHER | Age: 40
End: 2020-05-12

## 2020-05-12 ENCOUNTER — OFFICE VISIT (OUTPATIENT)
Dept: OBSTETRICS AND GYNECOLOGY | Facility: CLINIC | Age: 40
End: 2020-05-12
Payer: MEDICAID

## 2020-05-12 DIAGNOSIS — Z3A.29 29 WEEKS GESTATION OF PREGNANCY: Primary | ICD-10-CM

## 2020-05-12 DIAGNOSIS — Z34.93 PRENATAL CARE IN THIRD TRIMESTER: ICD-10-CM

## 2020-05-12 PROCEDURE — 99212 OFFICE O/P EST SF 10 MIN: CPT | Mod: TH,95,, | Performed by: NURSE PRACTITIONER

## 2020-05-12 PROCEDURE — 99212 PR OFFICE/OUTPT VISIT, EST, LEVL II, 10-19 MIN: ICD-10-PCS | Mod: TH,95,, | Performed by: NURSE PRACTITIONER

## 2020-05-12 NOTE — PROGRESS NOTES
39 y.o. female  at 29w4d   Reports + FM, denies VB, LOF or CTX  Doing well without concerns.   TWG: ?? lbs - didn't weigh today. 19lb gain as of  - reviewed weight rec and MAX  Pt has US scheduled for 6/3/20 to evaluate position of placenta  Reviewed warning signs, normal FKCs,  labor precautions and how/when to call.  RTC x 2 wks, call or present sooner prn. (will come on 6/3, same date as US)    Birth Center Risk Assessment: 2- consultation with OB to develop plan of care    0- CNM management in ABC  1- CNM management on L&D  2- Consultation with OB to develop  plan of care  3- Collaborative CNM/OB management with delivery on L&D  4- Permanent referral of care to MD      The patient location is: home  The chief complaint leading to consultation is: prenatal visit  Visit type: audio only  Total time spent with patient: 10 minutes  Each patient to whom he or she provides medical services by telemedicine is:  (1) informed of the relationship between the physician and patient and the respective role of any other health care provider with respect to management of the patient; and (2) notified that he or she may decline to receive medical services by telemedicine and may withdraw from such care at any time.

## 2020-05-12 NOTE — TELEPHONE ENCOUNTER
Called pt to inform her that BP she sent today was elevated and she needs to come in for BP check. No answer/LVM to call back and come to clinic

## 2020-05-18 PROBLEM — Z13.9 RISK AND FUNCTIONAL ASSESSMENT: Status: RESOLVED | Noted: 2020-02-15 | Resolved: 2020-05-18

## 2020-06-03 ENCOUNTER — CLINICAL SUPPORT (OUTPATIENT)
Dept: PEDIATRIC CARDIOLOGY | Facility: CLINIC | Age: 40
End: 2020-06-03
Payer: MEDICAID

## 2020-06-03 ENCOUNTER — ROUTINE PRENATAL (OUTPATIENT)
Dept: OBSTETRICS AND GYNECOLOGY | Facility: CLINIC | Age: 40
End: 2020-06-03
Payer: MEDICAID

## 2020-06-03 ENCOUNTER — PROCEDURE VISIT (OUTPATIENT)
Dept: MATERNAL FETAL MEDICINE | Facility: CLINIC | Age: 40
End: 2020-06-03
Payer: MEDICAID

## 2020-06-03 ENCOUNTER — OFFICE VISIT (OUTPATIENT)
Dept: PEDIATRIC CARDIOLOGY | Facility: CLINIC | Age: 40
End: 2020-06-03
Payer: MEDICAID

## 2020-06-03 VITALS — DIASTOLIC BLOOD PRESSURE: 60 MMHG | BODY MASS INDEX: 26.83 KG/M2 | WEIGHT: 181.69 LBS | SYSTOLIC BLOOD PRESSURE: 98 MMHG

## 2020-06-03 DIAGNOSIS — Z34.90 PREGNANCY, UNSPECIFIED GESTATIONAL AGE: ICD-10-CM

## 2020-06-03 DIAGNOSIS — Z36.89 ENCOUNTER FOR ULTRASOUND TO ASSESS FETAL GROWTH: ICD-10-CM

## 2020-06-03 DIAGNOSIS — O35.BXX0 ANOMALY OF HEART OF FETUS AFFECTING PREGNANCY, ANTEPARTUM, SINGLE OR UNSPECIFIED FETUS: ICD-10-CM

## 2020-06-03 DIAGNOSIS — Z13.9 RISK AND FUNCTIONAL ASSESSMENT: ICD-10-CM

## 2020-06-03 DIAGNOSIS — O35.BXX0 PREGNANCY COMPLICATED BY FETAL CONGENITAL HEART DISEASE, SINGLE OR UNSPECIFIED FETUS: Primary | ICD-10-CM

## 2020-06-03 DIAGNOSIS — O35.BXX0 FETAL CARDIAC ANOMALY COMPLICATING PREGNANCY, ANTEPARTUM, SINGLE GESTATION: Primary | ICD-10-CM

## 2020-06-03 DIAGNOSIS — O35.9XX0 KNOWN FETAL ANOMALY, ANTEPARTUM, SINGLE OR UNSPECIFIED FETUS: Primary | ICD-10-CM

## 2020-06-03 DIAGNOSIS — O35.9XX0 SUSPECTED FETAL ANOMALY, ANTEPARTUM, SINGLE OR UNSPECIFIED FETUS: ICD-10-CM

## 2020-06-03 DIAGNOSIS — O35.9XX0 SUSPECTED FETAL ANOMALY, ANTEPARTUM, SINGLE OR UNSPECIFIED FETUS: Primary | ICD-10-CM

## 2020-06-03 PROCEDURE — 99205 OFFICE O/P NEW HI 60 MIN: CPT | Mod: 25,S$PBB,, | Performed by: PEDIATRICS

## 2020-06-03 PROCEDURE — 99214 PR OFFICE/OUTPT VISIT, EST, LEVL IV, 30-39 MIN: ICD-10-PCS | Mod: 25,GT,S$PBB,TH | Performed by: OBSTETRICS & GYNECOLOGY

## 2020-06-03 PROCEDURE — 76827 ECHO EXAM OF FETAL HEART: CPT | Mod: 26,S$PBB,, | Performed by: PEDIATRICS

## 2020-06-03 PROCEDURE — 99999 PR PBB SHADOW E&M-EST. PATIENT-LVL I: ICD-10-PCS | Mod: PBBFAC,,, | Performed by: PEDIATRICS

## 2020-06-03 PROCEDURE — 76825 PR  SO2 FETAL HEART: ICD-10-PCS | Mod: 26,S$PBB,, | Performed by: PEDIATRICS

## 2020-06-03 PROCEDURE — 76816 OB US FOLLOW-UP PER FETUS: CPT | Mod: PBBFAC | Performed by: OBSTETRICS & GYNECOLOGY

## 2020-06-03 PROCEDURE — 76825 ECHO EXAM OF FETAL HEART: CPT | Mod: 26,S$PBB,, | Performed by: PEDIATRICS

## 2020-06-03 PROCEDURE — 76825 ECHO EXAM OF FETAL HEART: CPT | Mod: PBBFAC | Performed by: PEDIATRICS

## 2020-06-03 PROCEDURE — 99212 OFFICE O/P EST SF 10 MIN: CPT | Mod: TH,S$PBB,, | Performed by: ADVANCED PRACTICE MIDWIFE

## 2020-06-03 PROCEDURE — 76816 OB US FOLLOW-UP PER FETUS: CPT | Mod: 26,S$PBB,, | Performed by: OBSTETRICS & GYNECOLOGY

## 2020-06-03 PROCEDURE — 99214 OFFICE O/P EST MOD 30 MIN: CPT | Mod: 25,GT,S$PBB,TH | Performed by: OBSTETRICS & GYNECOLOGY

## 2020-06-03 PROCEDURE — 76816 PR  US,PREGNANT UTERUS,F/U,TRANSABD APP: ICD-10-PCS | Mod: 26,S$PBB,, | Performed by: OBSTETRICS & GYNECOLOGY

## 2020-06-03 PROCEDURE — 99999 PR PBB SHADOW E&M-EST. PATIENT-LVL II: CPT | Mod: PBBFAC,,, | Performed by: ADVANCED PRACTICE MIDWIFE

## 2020-06-03 PROCEDURE — 99999 PR PBB SHADOW E&M-EST. PATIENT-LVL II: ICD-10-PCS | Mod: PBBFAC,,, | Performed by: ADVANCED PRACTICE MIDWIFE

## 2020-06-03 PROCEDURE — 76827 PR  SO2 FETAL HEART DOPPLER: ICD-10-PCS | Mod: 26,S$PBB,, | Performed by: PEDIATRICS

## 2020-06-03 PROCEDURE — 93325 DOPPLER ECHO COLOR FLOW MAPG: CPT | Mod: 26,S$PBB,, | Performed by: PEDIATRICS

## 2020-06-03 PROCEDURE — 99212 PR OFFICE/OUTPT VISIT, EST, LEVL II, 10-19 MIN: ICD-10-PCS | Mod: TH,S$PBB,, | Performed by: ADVANCED PRACTICE MIDWIFE

## 2020-06-03 PROCEDURE — 93325 PR DOPPLER COLOR FLOW VELOCITY MAP: ICD-10-PCS | Mod: 26,S$PBB,, | Performed by: PEDIATRICS

## 2020-06-03 PROCEDURE — 99999 PR PBB SHADOW E&M-EST. PATIENT-LVL I: CPT | Mod: PBBFAC,,, | Performed by: PEDIATRICS

## 2020-06-03 PROCEDURE — 93325 DOPPLER ECHO COLOR FLOW MAPG: CPT | Mod: PBBFAC | Performed by: PEDIATRICS

## 2020-06-03 PROCEDURE — 99205 PR OFFICE/OUTPT VISIT, NEW, LEVL V, 60-74 MIN: ICD-10-PCS | Mod: 25,S$PBB,, | Performed by: PEDIATRICS

## 2020-06-03 PROCEDURE — 99212 OFFICE O/P EST SF 10 MIN: CPT | Mod: PBBFAC,25,27,TH | Performed by: ADVANCED PRACTICE MIDWIFE

## 2020-06-03 PROCEDURE — 76827 ECHO EXAM OF FETAL HEART: CPT | Mod: PBBFAC | Performed by: PEDIATRICS

## 2020-06-03 PROCEDURE — 99211 OFF/OP EST MAY X REQ PHY/QHP: CPT | Mod: PBBFAC,25 | Performed by: PEDIATRICS

## 2020-06-03 NOTE — PROGRESS NOTES
39 y.o. female  at 32w5d, by Estimated Date of Delivery: 20    Doing well today. She has an ultrasound following this visit.  Reviewed TW lbs    BMI  -- Discussed IOM recommended weight gain of:   Normal Weight 18.5-24.9  25-35     ROS  OBSTETRICS:   Contractions No   Bleeding No   Loss of fluid No   Fetal mvmnt Yes  GASTRO:   Nausea No   Vomiting No      OB History    Para Term  AB Living   5 1 1 0 2 1   SAB TAB Ectopic Multiple Live Births   1 0 0 0        # Outcome Date GA Lbr Ponce/2nd Weight Sex Delivery Anes PTL Lv   5 Current            4             3 AB            2 SAB            1 Term                Dating reviewed  Allergies and problem list reviewed and updated  Medical and surgical history reviewed  Prenatal labs reviewed and updated    PHYSICAL EXAM  BP 98/60   Wt 82.4 kg (181 lb 10.5 oz)   LMP 2019 (Within Weeks)   BMI 26.83 kg/m²     GENERAL: No acute distress  HEENT: Normocephalic, atraumatic  NEURO: Alert and oriented x3  PSYCH: Normal mood and affect  PULMONARY: Non-labored respiration; no tachypnea  ABD: Soft, gravid, nontender.      ASSESSMENT AND PLAN    MARCIANO ALVARADO Problems (from 20 to present)     Problem Noted Resolved    Alcohol use affecting pregnanccy--possible heavy use in early pregnancy: NO CURRENT use 2020 by Ashly Piper CNM No    Overview Addendum 3/8/2020  6:20 PM by Ashly Piper CNM     -See [facility] notes:  1. Recommend a f/u ultrasound in about 5 wks to complete fetal anatomical survey, interval growth, and amniotic fluid volume assessment  2. Recommend f/u ultrasound at about 30-32 wks gestation for interval fetal growth, amniotic fluid volume, and Placental location with assessment of blood vessels in lower  uterine segment/cervix area:  -- Primary OB to insure appt scheduled  -- recommend subsequent ultrasound about 4-6 wks for interval fetal growth, amniotic fluid volume  3. Bleeding precautions given to  patient  4. Patient did not report continued use of alcohol after the first trimester  5. Recommend Pediatrician be notified of patient's alcohol use in first trimester for management/monitoring of FAS per their recommendations/guidelines             Birth Center Risk Assessment: 2 - Consultation with OB to develop plan of care (alcohol use in early preg before being aware of pregnancy) 2/15/2020 by Precious Meyer CNM 2020 by Problem List Provider Inpatient Orders    Overview Signed 2/15/2020 11:27 AM by Precious Meyer CNM     Birth Center Risk Assessment: 2 - Consultation with OB to develop plan of care (alcohol use in early preg before being aware of pregnancy)    0- CNM management in ABC  1- CNM management on L&D  2- Consultation with OB to develop  plan of care  3- Collaborative CNM/OB management with delivery on L&D  4- Permanent referral of care to MD                 Patient plans to breast feed - reviewed breast feeding class here.  It's a girl!  Pediatrician: [facility]    Discussed that all patients admitted for labor will be tested for COVID-19 until further notice. Discussed current visitor policy. Patient verbalized understanding.    Reviewed warning signs, normal FM,  labor precautions, and how/when to call. Confirmed pt has after-hours number.    Follow-up: 2 weeks, call or present sooner PRN

## 2020-06-04 DIAGNOSIS — O35.BXX0 PREGNANCY COMPLICATED BY FETAL TETRALOGY OF FALLOT, SINGLE OR UNSPECIFIED FETUS: Primary | ICD-10-CM

## 2020-06-04 NOTE — PROGRESS NOTES
[facility] Maternal Fetal [address] Fetal Cardiology Clinic    Today, I had the pleasure of evaluating Sonal Simms who is now 39 y.o. and carrying her fourth pregnancy at 32 5/7 weeks gestation with an MARBELLA of 20. She was referred for evaluation of the fetal heart due to a concern for fetal congential heart disease.    She is carrying a female fetus, named Brigitte.      She has had a negative cell free DNA screen.    Obstetric History:    .  Her OB care is by Precious Meyer CNM.  Her M care is by Dr. Mccauley.      Past Medical History:   Diagnosis Date    Anxiety     Elevated liver enzymes     secondary to alcohol use    Panic attack 2020    Patient states that this was due to exhaustion after having difficulty falling asleep due to anxiety         Current Outpatient Medications:     hydrOXYzine pamoate (VISTARIL) 50 MG Cap, , Disp: , Rfl:     multivitamin capsule, Take 1 capsule by mouth once daily., Disp: , Rfl:     Family History: Negative for congenital heart disease, early coronary artery disease, sudden unexplained death, connective tissues disorders, genetic syndromes, multiple miscarriages or other congenital anomalies.    FETAL ECHOCARDIOGRAM (summary):  Fetal echocardiogram at 32 5/7 weeks gestation for a concern for fetal congential heart disease. MARBELLA 20.  Study limited by poor acoustic windows.  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  Large malalignment type VSD in the perimembranous area with a left to right shunt.  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  There is prograde flow across the pulmonary valve with mildly increased velocity.  The aortic arch is seen in limited views. The ascending aorta Z score is 0.71. The aortic isthmus Z score is -1.01.  No ectopy or sustained arrhythmia demonstrated throughout the study.  Normal fetal atrial and ductal level shunting.  Normal tricuspid and aortic valve  structure and function.  The mitral valve is not well seen.  Normal ductal and aortic arches.  Normal biventricular size and systolic function.  No pericardial effusion.  (Full report in electronic medical record)    Impression:  Single active female fetus at 32 wga.  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  There is prograde flow through the pulmonary valve, which is severely hypoplastic.  My suspicion is that the saturations will likely be ok after birth.  However, we will need to follow the sats in the NICU as the PDA closes.  If the saturations are good at this point, she can go home with her family and follow up with me in clinic, with an expected surgical palliation at 6 months of age.  If her saturations require supplemental pulmonary blood flow, then she will need a central shunt as a  - again, I do not think this is likely.    We talked about the possibility of a single gene mutation causing this disease, like DiGeorge syndrome and that we would assess for this with a chromosome microarray.  We discussed hypercyanotic spells associated with tetralogy and that this would necessitate a surgery.      I discussed with her that fetal echocardiography is insufficiently sensitive to rule out all septal defects, anomalies of pulmonary and systemic veins, arch anomalies, and some valvar abnormalities, nor can it ensure that the ductus arteriosus and foramen ovale will spontaneously close.     Recommendations:  1. Prenatal cardiac diagnosis: double outlet right ventricle - tetralogy of Fallot type  2. Any prenatal genetic diagnoses or other major associated abnormalities, conditions - no  3. Primary Fetal Cardiologist: Marie  Prenatal Recommendations:   1. Prenatal Cath MD Consult: No   2. Prenatal Congenital Heart Surgery Consult ? No   3. Follow up fetal cardiac evaluation in 2 weeks  Delivery and post tla recommendations as of last  fetal visit:  1. Recommend delivery at tertiary care center such as [facility] where Pediatric Cardiology and Congenital Heart Surgery care are immediately available  2. From fetal cardiac perspective, ok for vaginal delivery, preferably after 38-39 weeks gestation  3. Recommend scheduled weekday delivery during daytime hours so that all needed caretakers are immediately available  4. PGE should be available at bedside   5. Likely need for urgent intervention within first hours after delivery: No   6. Cardiology Consultant recommended to attend delivery: No   7. Transition to NICU   8. Cardiology consult: Yes  9.   Genetics recommendation: CMA  10.  surgical or cath intervention: hopefully nothing        The above information was discussed in detail including the use of diagrams, with 60 minutes of total face to face time, with greater than 50% with counseling and coordination of care.  The discussion of the diagnosis and treatment options is as described above.      Yaakov Salazar MD, MPH  Pediatric and Fetal Cardiology  [facility]   [address] [place] [address]    Office: [phone]  Cell: [phone]

## 2020-06-05 PROBLEM — O35.BXX0 FETAL CARDIAC ANOMALY COMPLICATING PREGNANCY, ANTEPARTUM, SINGLE GESTATION: Status: ACTIVE | Noted: 2020-06-05

## 2020-06-09 ENCOUNTER — CLINICAL SUPPORT (OUTPATIENT)
Dept: PEDIATRIC CARDIOLOGY | Facility: CLINIC | Age: 40
End: 2020-06-09
Payer: MEDICAID

## 2020-06-09 ENCOUNTER — TELEPHONE (OUTPATIENT)
Dept: PEDIATRIC CARDIOLOGY | Facility: CLINIC | Age: 40
End: 2020-06-09

## 2020-06-09 ENCOUNTER — OFFICE VISIT (OUTPATIENT)
Dept: PEDIATRIC CARDIOLOGY | Facility: CLINIC | Age: 40
End: 2020-06-09
Payer: MEDICAID

## 2020-06-09 VITALS
WEIGHT: 183.63 LBS | SYSTOLIC BLOOD PRESSURE: 118 MMHG | HEART RATE: 77 BPM | HEIGHT: 69 IN | BODY MASS INDEX: 27.2 KG/M2 | DIASTOLIC BLOOD PRESSURE: 74 MMHG

## 2020-06-09 DIAGNOSIS — O35.BXX0 PREGNANCY COMPLICATED BY FETAL TETRALOGY OF FALLOT, SINGLE OR UNSPECIFIED FETUS: ICD-10-CM

## 2020-06-09 DIAGNOSIS — O35.BXX0 FETAL CARDIAC ANOMALY COMPLICATING PREGNANCY, ANTEPARTUM, SINGLE GESTATION: Primary | ICD-10-CM

## 2020-06-09 PROCEDURE — 99999 PR PBB SHADOW E&M-EST. PATIENT-LVL III: CPT | Mod: PBBFAC,,, | Performed by: PEDIATRICS

## 2020-06-09 PROCEDURE — 99999 PR PBB SHADOW E&M-EST. PATIENT-LVL III: ICD-10-PCS | Mod: PBBFAC,,, | Performed by: PEDIATRICS

## 2020-06-09 PROCEDURE — 99213 OFFICE O/P EST LOW 20 MIN: CPT | Mod: PBBFAC | Performed by: PEDIATRICS

## 2020-06-09 PROCEDURE — 99211 OFF/OP EST MAY X REQ PHY/QHP: CPT | Mod: PBBFAC,27

## 2020-06-09 PROCEDURE — 99999 PR PBB SHADOW E&M-EST. PATIENT-LVL I: ICD-10-PCS | Mod: PBBFAC,,,

## 2020-06-09 PROCEDURE — 99999 PR PBB SHADOW E&M-EST. PATIENT-LVL I: CPT | Mod: PBBFAC,,,

## 2020-06-09 PROCEDURE — 99215 OFFICE O/P EST HI 40 MIN: CPT | Mod: S$PBB,,, | Performed by: PEDIATRICS

## 2020-06-09 PROCEDURE — 99215 PR OFFICE/OUTPT VISIT, EST, LEVL V, 40-54 MIN: ICD-10-PCS | Mod: S$PBB,,, | Performed by: PEDIATRICS

## 2020-06-09 NOTE — PROGRESS NOTES
[facility] Maternal Fetal [address] Fetal Cardiology Clinic    Today, I had the pleasure of evaluating Sonal Simms who is now 39 y.o. and carrying her fourth pregnancy at 33 4/7 weeks gestation with an MARBELLA of 20. She was referred for evaluation of the fetal heart due to a concern for fetal congential heart disease.  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    She is carrying a female fetus, named Brigitte.      She has had a negative cell free DNA screen.    Obstetric History:    .  Her OB care is by Precious Meyer CNM.  Her M care is by Dr. cMcauley.      Past Medical History:   Diagnosis Date    Anxiety     Elevated liver enzymes     secondary to alcohol use    Panic attack 2020    Patient states that this was due to exhaustion after having difficulty falling asleep due to anxiety         Current Outpatient Medications:     hydrOXYzine pamoate (VISTARIL) 50 MG Cap, , Disp: , Rfl:     multivitamin capsule, Take 1 capsule by mouth once daily., Disp: , Rfl:     Family History: Negative for congenital heart disease, early coronary artery disease, sudden unexplained death, connective tissues disorders, genetic syndromes, multiple miscarriages or other congenital anomalies.    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 MARBELLA  20.  Study limited by poor acoustic windows.  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  Large malalignment type VSD in the perimembranous area with a left to right shunt.  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  There is prograde flow across the pulmonary valve with mildly increased velocity.  No ectopy or sustained arrhythmia demonstrated throughout the study.  Normal fetal atrial and ductal level shunting.  Normal AV valve and  aortic valve structure and function.  Normal ductal and aortic arches.  Normal biventricular size and systolic function.  No pericardial effusion.  (Full report in electronic medical record)    Impression:  Single active female fetus at 33 wga.  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  There is prograde flow through the pulmonary valve, which is moderatelyhypoplastic.  My suspicion is that the saturations will likely be ok after birth.  However, we will need to follow the sats in the NICU as the PDA closes.  If the saturations are good at this point, she can go home with her family and follow up with me in clinic, with an expected surgical palliation at 6 months of age.  If her saturations require supplemental pulmonary blood flow, then she will need a central shunt as a  - again, I do not think this is likely.    We talked about the possibility of a single gene mutation causing this disease, like DiGeorge syndrome and that we would assess for this with a chromosome microarray.  We discussed hypercyanotic spells associated with tetralogy and that this would necessitate a surgery.      I discussed with her that fetal echocardiography is insufficiently sensitive to rule out all septal defects, anomalies of pulmonary and systemic veins, arch anomalies, and some valvar abnormalities, nor can it ensure that the ductus arteriosus and foramen ovale will spontaneously close.     I will not see Ms. Simms again until after the baby is born.    Recommendations:  1. Prenatal cardiac diagnosis: double outlet right ventricle - tetralogy of Fallot type  2. Any prenatal genetic diagnoses or other major associated abnormalities, conditions - no  3. Primary Fetal Cardiologist: Marie  Prenatal Recommendations:   1. Prenatal Cath MD Consult: No   2. Prenatal Congenital Heart Surgery Consult ? No   Delivery and post tal recommendations as of last fetal  visit:  1. Recommend delivery at tertiary care center such as [facility] where Pediatric Cardiology and Congenital Heart Surgery care are immediately available  2. From fetal cardiac perspective, ok for vaginal delivery, preferably after 38-39 weeks gestation  3. Recommend scheduled weekday delivery during daytime hours so that all needed caretakers are immediately available  4. PGE should be available at bedside   5. Likely need for urgent intervention within first hours after delivery: No   6. Cardiology Consultant recommended to attend delivery: No   7. Transition to NICU   8. Cardiology consult: Yes  9.   Genetics recommendation: CMA  10.  surgical or cath intervention: hopefully nothing        The above information was discussed in detail including the use of diagrams, with 60 minutes of total face to face time, with greater than 50% with counseling and coordination of care.  The discussion of the diagnosis and treatment options is as described above.      Yaakov Salazar MD, MPH  Pediatric and Fetal Cardiology  [facility]   [address] [place] [address]    Office: [phone]  Cell: [phone]

## 2020-06-09 NOTE — TELEPHONE ENCOUNTER
This patient was discussed in the [facility] multidisciplinary high risk fetal conference.  In attendance were physicians and nurses from fetal cardiology, maternal fetal medicine and the  intensive care unit.  The prenatal, delivery, and  plan was discussed and documentation of this plan was provided to all associated providers.

## 2020-06-10 ENCOUNTER — PROCEDURE VISIT (OUTPATIENT)
Dept: MATERNAL FETAL MEDICINE | Facility: CLINIC | Age: 40
End: 2020-06-10
Payer: MEDICAID

## 2020-06-10 ENCOUNTER — INITIAL CONSULT (OUTPATIENT)
Dept: MATERNAL FETAL MEDICINE | Facility: CLINIC | Age: 40
End: 2020-06-10
Payer: MEDICAID

## 2020-06-10 VITALS
SYSTOLIC BLOOD PRESSURE: 106 MMHG | WEIGHT: 183.44 LBS | BODY MASS INDEX: 27.07 KG/M2 | DIASTOLIC BLOOD PRESSURE: 74 MMHG

## 2020-06-10 DIAGNOSIS — Z36.89 ENCOUNTER FOR ULTRASOUND TO ASSESS FETAL GROWTH: Primary | ICD-10-CM

## 2020-06-10 DIAGNOSIS — O35.BXX0 TETRALOGY OF FALLOT OF FETUS AFFECTING MANAGEMENT OF MOTHER, ANTEPARTUM, SINGLE OR UNSPECIFIED FETUS: Primary | ICD-10-CM

## 2020-06-10 DIAGNOSIS — O35.9XX0 KNOWN FETAL ANOMALY, ANTEPARTUM, SINGLE OR UNSPECIFIED FETUS: ICD-10-CM

## 2020-06-10 PROCEDURE — 76815 OB US LIMITED FETUS(S): CPT | Mod: 26,S$PBB,, | Performed by: OBSTETRICS & GYNECOLOGY

## 2020-06-10 PROCEDURE — 76815 PR  US,PREGNANT UTERUS,LIMITED, 1/> FETUSES: ICD-10-PCS | Mod: 26,S$PBB,, | Performed by: OBSTETRICS & GYNECOLOGY

## 2020-06-10 PROCEDURE — 99999 PR PBB SHADOW E&M-EST. PATIENT-LVL III: ICD-10-PCS | Mod: PBBFAC,,, | Performed by: OBSTETRICS & GYNECOLOGY

## 2020-06-10 PROCEDURE — 99213 PR OFFICE/OUTPT VISIT, EST, LEVL III, 20-29 MIN: ICD-10-PCS | Mod: S$PBB,TH,25, | Performed by: OBSTETRICS & GYNECOLOGY

## 2020-06-10 PROCEDURE — 99213 OFFICE O/P EST LOW 20 MIN: CPT | Mod: S$PBB,TH,25, | Performed by: OBSTETRICS & GYNECOLOGY

## 2020-06-10 PROCEDURE — 99213 OFFICE O/P EST LOW 20 MIN: CPT | Mod: PBBFAC,TH,25 | Performed by: OBSTETRICS & GYNECOLOGY

## 2020-06-10 PROCEDURE — 76815 OB US LIMITED FETUS(S): CPT | Mod: PBBFAC | Performed by: OBSTETRICS & GYNECOLOGY

## 2020-06-10 PROCEDURE — 99999 PR PBB SHADOW E&M-EST. PATIENT-LVL III: CPT | Mod: PBBFAC,,, | Performed by: OBSTETRICS & GYNECOLOGY

## 2020-06-11 DIAGNOSIS — O35.BXX0 FETAL CARDIAC ANOMALY COMPLICATING PREGNANCY, ANTEPARTUM, SINGLE GESTATION: Primary | ICD-10-CM

## 2020-06-15 ENCOUNTER — DOCUMENTATION ONLY (OUTPATIENT)
Dept: OBSTETRICS AND GYNECOLOGY | Facility: CLINIC | Age: 40
End: 2020-06-15

## 2020-06-16 NOTE — PROGRESS NOTES
Date: 6/15/2020    Reviewed patient medical and obstetrical history with Dr. Mcneill.     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 Mcneill recommendations for patient:  Pt to deliver on L&D with NICU in attendance.  NO delayed cord clamping and to follow Dr. Salazar's recommendations          1.Recommend delivery at tertiary care center such as [facility] where Pediatric Cardiology and Congenital Heart Surgery care are immediately available  2. From fetal cardiac perspective, ok for vaginal delivery, preferably after 38-39 weeks gestation  3. Recommend scheduled weekday delivery during daytime hours so that all needed caretakers are immediately available  4. PGE should be available at bedside   5. Likely need for urgent intervention within first hours after delivery: No   6. Cardiology Consultant recommended to attend delivery: No   7. Transition to NICU   8. Cardiology consult: Yes  9.   Genetics recommendation: [facility]  10.  surgical or cath intervention: hopefully nothing

## 2020-06-17 ENCOUNTER — ROUTINE PRENATAL (OUTPATIENT)
Dept: OBSTETRICS AND GYNECOLOGY | Facility: CLINIC | Age: 40
End: 2020-06-17
Payer: MEDICAID

## 2020-06-17 ENCOUNTER — HOSPITAL ENCOUNTER (OUTPATIENT)
Dept: PERINATAL CARE | Facility: OTHER | Age: 40
Discharge: HOME | End: 2020-06-17
Attending: ADVANCED PRACTICE MIDWIFE
Payer: MEDICAID

## 2020-06-17 VITALS — BODY MASS INDEX: 27.3 KG/M2 | SYSTOLIC BLOOD PRESSURE: 108 MMHG | DIASTOLIC BLOOD PRESSURE: 66 MMHG | WEIGHT: 184.94 LBS

## 2020-06-17 DIAGNOSIS — O35.BXX0 FETAL CARDIAC ANOMALY COMPLICATING PREGNANCY, ANTEPARTUM, SINGLE GESTATION: ICD-10-CM

## 2020-06-17 DIAGNOSIS — Z34.90 PREGNANCY WITH ONE FETUS, ANTEPARTUM: ICD-10-CM

## 2020-06-17 DIAGNOSIS — Z34.90 PREGNANCY, UNSPECIFIED GESTATIONAL AGE: ICD-10-CM

## 2020-06-17 DIAGNOSIS — F41.9 ANXIETY: Primary | ICD-10-CM

## 2020-06-17 PROCEDURE — 76818 FETAL BIOPHYS PROFILE W/NST: CPT | Mod: 26,,, | Performed by: OBSTETRICS & GYNECOLOGY

## 2020-06-17 PROCEDURE — 99213 OFFICE O/P EST LOW 20 MIN: CPT | Mod: TH,S$PBB,, | Performed by: ADVANCED PRACTICE MIDWIFE

## 2020-06-17 PROCEDURE — 76818 PR US, OB, FETAL BIOPHYSICAL, W/NST: ICD-10-PCS | Mod: 26,,, | Performed by: OBSTETRICS & GYNECOLOGY

## 2020-06-17 PROCEDURE — 99213 PR OFFICE/OUTPT VISIT, EST, LEVL III, 20-29 MIN: ICD-10-PCS | Mod: TH,S$PBB,, | Performed by: ADVANCED PRACTICE MIDWIFE

## 2020-06-17 PROCEDURE — 99999 PR PBB SHADOW E&M-EST. PATIENT-LVL II: ICD-10-PCS | Mod: PBBFAC,,, | Performed by: ADVANCED PRACTICE MIDWIFE

## 2020-06-17 PROCEDURE — 76819 FETAL BIOPHYS PROFIL W/O NST: CPT

## 2020-06-17 PROCEDURE — 99212 OFFICE O/P EST SF 10 MIN: CPT | Mod: PBBFAC,25,TH | Performed by: ADVANCED PRACTICE MIDWIFE

## 2020-06-17 PROCEDURE — 99999 PR PBB SHADOW E&M-EST. PATIENT-LVL II: CPT | Mod: PBBFAC,,, | Performed by: ADVANCED PRACTICE MIDWIFE

## 2020-06-17 RX ORDER — HYDROXYZINE PAMOATE 50 MG/1
50 CAPSULE ORAL EVERY 6 HOURS PRN
Qty: 90 CAPSULE | Refills: 1 | Status: SHIPPED | OUTPATIENT
Start: 2020-06-17 | End: 2022-01-22

## 2020-06-17 NOTE — PROGRESS NOTES
Chief Complaint   Patient presents with    Routine Prenatal Visit       39 y.o. female  at 34w5d, by Estimated Date of Delivery: 20    Complaints today: FOB. Doing well today.  Birth plan,  Reviewed TW lbs    ROS  OBSTETRICS:   Contractions No   Bleeding No   Loss of fluid No   Fetal mvmnt yes  GASTRO:   Nausea No   Vomiting No      OB History    Para Term  AB Living   5 1 1 0 2 1   SAB TAB Ectopic Multiple Live Births   1 0 0 0        # Outcome Date GA Lbr Ponce/2nd Weight Sex Delivery Anes PTL Lv   5 Current            4             3 AB            2 SAB            1 Term                Dating reviewed  Allergies and problem list reviewed and updated  Medical and surgical history reviewed  Prenatal labs reviewed and updated    PHYSICAL EXAM  /66   Wt 83.9 kg (184 lb 15.5 oz)   LMP 2019 (Within Weeks)   BMI 27.30 kg/m²     GENERAL: No acute distress  HEENT: Normocephalic, atraumatic  NEURO: Alert and oriented x3  PSYCH: Normal mood and affect  PULMONARY: Non-labored respiration; no tachypnea  ABD: Soft, gravid, nontender.      ASSESSMENT AND PLAN    MARCIANO ALVARADO Problems (from 20 to present)     Problem Noted Resolved    Fetal cardiac anomaly complicating pregnancy, antepartum, single gestation 2020 by Precious Meyer CNM No    Overview Signed 2020 11:28 AM by Precious Meyer CNM     See cardiology note from 6/3/20    Impression:  Single active female fetus at 32 wga.  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  There is prograde flow through the pulmonary valve, which is severely hypoplastic.  My suspicion is that the saturations will likely be ok after birth.  However, we will need to follow the sats in the NICU as the PDA closes.  If the saturations are good at this point, she can go home with her family and follow up with me in clinic, with an expected surgical  palliation at 6 months of age.  If her saturations require supplemental pulmonary blood flow, then she will need a central shunt as a  - again, I do not think this is likely.     We talked about the possibility of a single gene mutation causing this disease, like DiGeorge syndrome and that we would assess for this with a chromosome microarray.  We discussed hypercyanotic spells associated with tetralogy and that this would necessitate a surgery.       I discussed with her that fetal echocardiography is insufficiently sensitive to rule out all septal defects, anomalies of pulmonary and systemic veins, arch anomalies, and some valvar abnormalities, nor can it ensure that the ductus arteriosus and foramen ovale will spontaneously close.      Recommendations:  1. Prenatal cardiac diagnosis: double outlet right ventricle - tetralogy of Fallot type  2. Any prenatal genetic diagnoses or other major associated abnormalities, conditions - no  3. Primary Fetal Cardiologist: Marie  Prenatal Recommendations:   1. Prenatal Cath MD Consult: No   2. Prenatal Congenital Heart Surgery Consult ? No   3. Follow up fetal cardiac evaluation in 2 weeks  Delivery and post  recommendations as of last fetal visit:  1. Recommend delivery at tertiary care center such as [facility] where Pediatric Cardiology and Congenital Heart Surgery care are immediately available  2. From fetal cardiac perspective, ok for vaginal delivery, preferably after 38-39 weeks gestation  3. Recommend scheduled weekday delivery during daytime hours so that all needed caretakers are immediately available  4. PGE should be available at bedside   5. Likely need for urgent intervention within first hours after delivery: No   6. Cardiology Consultant recommended to attend delivery: No   7. Transition to NICU   8. Cardiology consult: Yes  9.   Genetics recommendation: CMA  10.  surgical or cath intervention: hopefully nothing          Birth Center Risk Assessment: 1-management on labor and Delivery due to fetal cardiac anomaly 2/15/2020 by Precious eMyer CNM No    Overview Addendum 6/5/2020 11:29 AM by Precious Meyer CNM     Birth Center Risk Assessment: 1-management on labor and Delivery due to fetal cardiac anomaly    0- CNM management in ABC  1- CNM management on L&D  2- Consultation with OB to develop  plan of care  3- Collaborative CNM/OB management with delivery on L&D  4- Permanent referral of care to MD           Alcohol use affecting pregnanccy--possible heavy use in early pregnancy: NO CURRENT use 1/30/2020 by Ashly Piper CNM No    Overview Addendum 3/8/2020  6:20 PM by Ashly Piper CNM     -See MFM notes:  1. Recommend a f/u ultrasound in about 5 wks to complete fetal anatomical survey, interval growth, and amniotic fluid volume assessment  2. Recommend f/u ultrasound at about 30-32 wks gestation for interval fetal growth, amniotic fluid volume, and Placental location with assessment of blood vessels in lower  uterine segment/cervix area:  -- Primary OB to insure appt scheduled  -- recommend subsequent ultrasound about 4-6 wks for interval fetal growth, amniotic fluid volume  3. Bleeding precautions given to patient  4. Patient did not report continued use of alcohol after the first trimester  5. Recommend Pediatrician be notified of patient's alcohol use in first trimester for management/monitoring of FAS per their recommendations/guidelines                   Reviewed upcoming 36wk labs .   Reviewed patient does not have a history of genital HSV.     Reviewed ABC risk assessment with the patient:    Birth Center Risk Assessment: 1-management on labor and Delivery    0- CNM management in ABC  1- CNM management on L&D  2- Consultation with OB to develop  plan of care  3- Collaborative CNM/OB management with delivery on L&D  4- Permanent referral of care to MD    She understands she is not a candidate for the  ABC. Reviewed potential risks which could arise, that could change this status.    Reviewed warning signs, normal FM,  labor precautions, and how/when to call.  Confirmed pt has after-hours number.    Follow-up: 2 weeks, call or present sooner PRN

## 2020-06-24 ENCOUNTER — HOSPITAL ENCOUNTER (OUTPATIENT)
Dept: PERINATAL CARE | Facility: OTHER | Age: 40
Discharge: HOME | End: 2020-06-24
Attending: ADVANCED PRACTICE MIDWIFE
Payer: MEDICAID

## 2020-06-24 DIAGNOSIS — O35.BXX0 FETAL CARDIAC ANOMALY COMPLICATING PREGNANCY, ANTEPARTUM, SINGLE GESTATION: ICD-10-CM

## 2020-06-24 PROCEDURE — 76819 FETAL BIOPHYS PROFIL W/O NST: CPT | Mod: 26,,, | Performed by: OBSTETRICS & GYNECOLOGY

## 2020-06-24 PROCEDURE — 76819 PR US, OB, FETAL BIOPHYSICAL, W/O NST: ICD-10-PCS | Mod: 26,,, | Performed by: OBSTETRICS & GYNECOLOGY

## 2020-06-24 PROCEDURE — 76819 FETAL BIOPHYS PROFIL W/O NST: CPT

## 2020-06-30 ENCOUNTER — PROCEDURE VISIT (OUTPATIENT)
Dept: MATERNAL FETAL MEDICINE | Facility: CLINIC | Age: 40
End: 2020-06-30
Payer: MEDICAID

## 2020-06-30 ENCOUNTER — INITIAL CONSULT (OUTPATIENT)
Dept: MATERNAL FETAL MEDICINE | Facility: CLINIC | Age: 40
End: 2020-06-30
Payer: MEDICAID

## 2020-06-30 VITALS
DIASTOLIC BLOOD PRESSURE: 70 MMHG | WEIGHT: 189.63 LBS | BODY MASS INDEX: 27.99 KG/M2 | SYSTOLIC BLOOD PRESSURE: 102 MMHG

## 2020-06-30 DIAGNOSIS — O36.5930 INTRAUTERINE GROWTH RESTRICTION (IUGR) AFFECTING CARE OF MOTHER, THIRD TRIMESTER, SINGLE OR UNSPECIFIED FETUS: ICD-10-CM

## 2020-06-30 DIAGNOSIS — Z36.89 ENCOUNTER FOR ULTRASOUND TO ASSESS FETAL GROWTH: Primary | ICD-10-CM

## 2020-06-30 DIAGNOSIS — Z36.89 ENCOUNTER FOR ULTRASOUND TO ASSESS FETAL GROWTH: ICD-10-CM

## 2020-06-30 DIAGNOSIS — O35.BXX0 FETAL CARDIAC ANOMALY COMPLICATING PREGNANCY, ANTEPARTUM, SINGLE GESTATION: ICD-10-CM

## 2020-06-30 PROCEDURE — 99999 PR PBB SHADOW E&M-EST. PATIENT-LVL III: CPT | Mod: PBBFAC,,, | Performed by: OBSTETRICS & GYNECOLOGY

## 2020-06-30 PROCEDURE — 99213 PR OFFICE/OUTPT VISIT, EST, LEVL III, 20-29 MIN: ICD-10-PCS | Mod: S$PBB,TH,25, | Performed by: OBSTETRICS & GYNECOLOGY

## 2020-06-30 PROCEDURE — 76819 PR US, OB, FETAL BIOPHYSICAL, W/O NST: ICD-10-PCS | Mod: 26,S$PBB,, | Performed by: OBSTETRICS & GYNECOLOGY

## 2020-06-30 PROCEDURE — 76820 UMBILICAL ARTERY ECHO: CPT | Mod: 26,S$PBB,, | Performed by: OBSTETRICS & GYNECOLOGY

## 2020-06-30 PROCEDURE — 99213 OFFICE O/P EST LOW 20 MIN: CPT | Mod: PBBFAC,TH | Performed by: OBSTETRICS & GYNECOLOGY

## 2020-06-30 PROCEDURE — 76820 PR US, OB DOPPLER, FETAL UMBILICAL ARTERY ECHO: ICD-10-PCS | Mod: 26,S$PBB,, | Performed by: OBSTETRICS & GYNECOLOGY

## 2020-06-30 PROCEDURE — 76819 FETAL BIOPHYS PROFIL W/O NST: CPT | Mod: PBBFAC | Performed by: OBSTETRICS & GYNECOLOGY

## 2020-06-30 PROCEDURE — 99999 PR PBB SHADOW E&M-EST. PATIENT-LVL III: ICD-10-PCS | Mod: PBBFAC,,, | Performed by: OBSTETRICS & GYNECOLOGY

## 2020-06-30 PROCEDURE — 76816 OB US FOLLOW-UP PER FETUS: CPT | Mod: PBBFAC | Performed by: OBSTETRICS & GYNECOLOGY

## 2020-06-30 PROCEDURE — 76820 UMBILICAL ARTERY ECHO: CPT | Mod: PBBFAC | Performed by: OBSTETRICS & GYNECOLOGY

## 2020-06-30 PROCEDURE — 99213 OFFICE O/P EST LOW 20 MIN: CPT | Mod: S$PBB,TH,25, | Performed by: OBSTETRICS & GYNECOLOGY

## 2020-06-30 PROCEDURE — 76816 PR  US,PREGNANT UTERUS,F/U,TRANSABD APP: ICD-10-PCS | Mod: 26,S$PBB,, | Performed by: OBSTETRICS & GYNECOLOGY

## 2020-06-30 PROCEDURE — 76819 FETAL BIOPHYS PROFIL W/O NST: CPT | Mod: 26,S$PBB,, | Performed by: OBSTETRICS & GYNECOLOGY

## 2020-06-30 PROCEDURE — 76816 OB US FOLLOW-UP PER FETUS: CPT | Mod: 26,S$PBB,, | Performed by: OBSTETRICS & GYNECOLOGY

## 2020-07-01 ENCOUNTER — ROUTINE PRENATAL (OUTPATIENT)
Dept: OBSTETRICS AND GYNECOLOGY | Facility: CLINIC | Age: 40
End: 2020-07-01
Payer: MEDICAID

## 2020-07-01 ENCOUNTER — LAB VISIT (OUTPATIENT)
Dept: LAB | Facility: OTHER | Age: 40
End: 2020-07-01
Attending: ADVANCED PRACTICE MIDWIFE
Payer: MEDICAID

## 2020-07-01 VITALS
SYSTOLIC BLOOD PRESSURE: 120 MMHG | WEIGHT: 189.38 LBS | DIASTOLIC BLOOD PRESSURE: 72 MMHG | BODY MASS INDEX: 27.95 KG/M2

## 2020-07-01 DIAGNOSIS — Z34.90 PREGNANCY, UNSPECIFIED GESTATIONAL AGE: ICD-10-CM

## 2020-07-01 DIAGNOSIS — Z34.93 THIRD TRIMESTER PREGNANCY: Primary | ICD-10-CM

## 2020-07-01 LAB
BASOPHILS # BLD AUTO: 0.03 K/UL (ref 0–0.2)
BASOPHILS NFR BLD: 0.4 % (ref 0–1.9)
DIFFERENTIAL METHOD: NORMAL
EOSINOPHIL # BLD AUTO: 0.1 K/UL (ref 0–0.5)
EOSINOPHIL NFR BLD: 1.6 % (ref 0–8)
ERYTHROCYTE [DISTWIDTH] IN BLOOD BY AUTOMATED COUNT: 12.6 % (ref 11.5–14.5)
HCT VFR BLD AUTO: 37.2 % (ref 37–48.5)
HGB BLD-MCNC: 12.3 G/DL (ref 12–16)
IMM GRANULOCYTES # BLD AUTO: 0.04 K/UL (ref 0–0.04)
IMM GRANULOCYTES NFR BLD AUTO: 0.5 % (ref 0–0.5)
LYMPHOCYTES # BLD AUTO: 1.5 K/UL (ref 1–4.8)
LYMPHOCYTES NFR BLD: 20.1 % (ref 18–48)
MCH RBC QN AUTO: 29.3 PG (ref 27–31)
MCHC RBC AUTO-ENTMCNC: 33.1 G/DL (ref 32–36)
MCV RBC AUTO: 89 FL (ref 82–98)
MONOCYTES # BLD AUTO: 0.7 K/UL (ref 0.3–1)
MONOCYTES NFR BLD: 9 % (ref 4–15)
NEUTROPHILS # BLD AUTO: 5.1 K/UL (ref 1.8–7.7)
NEUTROPHILS NFR BLD: 68.4 % (ref 38–73)
NRBC BLD-RTO: 0 /100 WBC
PLATELET # BLD AUTO: 235 K/UL (ref 150–350)
PMV BLD AUTO: 9.8 FL (ref 9.2–12.9)
RBC # BLD AUTO: 4.2 M/UL (ref 4–5.4)
WBC # BLD AUTO: 7.47 K/UL (ref 3.9–12.7)

## 2020-07-01 PROCEDURE — 36415 COLL VENOUS BLD VENIPUNCTURE: CPT

## 2020-07-01 PROCEDURE — 99999 PR PBB SHADOW E&M-EST. PATIENT-LVL II: CPT | Mod: PBBFAC,,, | Performed by: ADVANCED PRACTICE MIDWIFE

## 2020-07-01 PROCEDURE — 99212 OFFICE O/P EST SF 10 MIN: CPT | Mod: PBBFAC,TH | Performed by: ADVANCED PRACTICE MIDWIFE

## 2020-07-01 PROCEDURE — 99999 PR PBB SHADOW E&M-EST. PATIENT-LVL II: ICD-10-PCS | Mod: PBBFAC,,, | Performed by: ADVANCED PRACTICE MIDWIFE

## 2020-07-01 PROCEDURE — 99212 OFFICE O/P EST SF 10 MIN: CPT | Mod: S$PBB,TH,, | Performed by: ADVANCED PRACTICE MIDWIFE

## 2020-07-01 PROCEDURE — 85025 COMPLETE CBC W/AUTO DIFF WBC: CPT

## 2020-07-01 PROCEDURE — 86592 SYPHILIS TEST NON-TREP QUAL: CPT

## 2020-07-01 PROCEDURE — 86703 HIV-1/HIV-2 1 RESULT ANTBDY: CPT

## 2020-07-01 PROCEDURE — 99212 PR OFFICE/OUTPT VISIT, EST, LEVL II, 10-19 MIN: ICD-10-PCS | Mod: S$PBB,TH,, | Performed by: ADVANCED PRACTICE MIDWIFE

## 2020-07-01 PROCEDURE — 87081 CULTURE SCREEN ONLY: CPT

## 2020-07-02 ENCOUNTER — HOSPITAL ENCOUNTER (OUTPATIENT)
Dept: PERINATAL CARE | Facility: OTHER | Age: 40
Discharge: HOME | End: 2020-07-02
Attending: ADVANCED PRACTICE MIDWIFE
Payer: MEDICAID

## 2020-07-02 DIAGNOSIS — O35.BXX0 FETAL CARDIAC ANOMALY COMPLICATING PREGNANCY, ANTEPARTUM, SINGLE GESTATION: ICD-10-CM

## 2020-07-02 LAB — HIV 1+2 AB+HIV1 P24 AG SERPL QL IA: NEGATIVE

## 2020-07-02 PROCEDURE — 76820 UMBILICAL ARTERY ECHO: CPT | Mod: 26,,, | Performed by: OBSTETRICS & GYNECOLOGY

## 2020-07-02 PROCEDURE — 76820 PR US, OB DOPPLER, FETAL UMBILICAL ARTERY ECHO: ICD-10-PCS | Mod: 26,,, | Performed by: OBSTETRICS & GYNECOLOGY

## 2020-07-02 PROCEDURE — 76819 FETAL BIOPHYS PROFIL W/O NST: CPT

## 2020-07-02 PROCEDURE — 76819 PR US, OB, FETAL BIOPHYSICAL, W/O NST: ICD-10-PCS | Mod: 26,,, | Performed by: OBSTETRICS & GYNECOLOGY

## 2020-07-02 PROCEDURE — 76819 FETAL BIOPHYS PROFIL W/O NST: CPT | Mod: 26,,, | Performed by: OBSTETRICS & GYNECOLOGY

## 2020-07-02 NOTE — PROGRESS NOTES
No chief complaint on file.      39 y.o. female  at 36w5d, by Estimated Date of Delivery: 20  Presents with Dario (partner) today-his first child.   Breast pump prescription requested and provided.   Complaints today: Recent added fetal diagnosis of growth restriction (IUGR)--baby in 3rd%ile and now undergoing PNT biweekly.  Expected fetal cardiac anomaly--long discussion today about plan of care and patient questions whether delivery in the OR is necessary--plan to reach out to MD team to look into this and whether or not we have a possible option to deliver in the labor room.  MFM suggested NICU may request delivery in the OR  Patient made aware today--recommend NO DELAYED cord clamping and induction obviously likely secondary to this added issue and heart defect. .  Reviewed TW.6 lbs    ROS  OBSTETRICS:   Contractions: Nothing regular   Bleeding No   Loss of fluid No   Fetal mvmnt present  GASTRO:   Nausea No   Vomiting No      OB History    Para Term  AB Living   5 1 1 0 2 1   SAB TAB Ectopic Multiple Live Births   1 0 0 0        # Outcome Date GA Lbr Ponce/2nd Weight Sex Delivery Anes PTL Lv   5 Current            4             3 AB            2 SAB            1 Term                Dating reviewed  Allergies and problem list reviewed and updated  Medical and surgical history reviewed  Prenatal labs reviewed and updated    PHYSICAL EXAM  /72   Wt 85.9 kg (189 lb 6 oz)   LMP 2019 (Within Weeks)   BMI 27.95 kg/m²     GENERAL: No acute distress  HEENT: Normocephalic, atraumatic  NEURO: Alert and oriented x3  PSYCH: Normal mood and affect  PULMONARY: Non-labored respiration; no tachypnea  ABD: Soft, gravid, nontender.      ASSESSMENT AND PLAN    MARCIANO ALVARADO Problems (from 20 to present)     Problem Noted Resolved    Fetal cardiac anomaly complicating pregnancy, antepartum, single gestation 2020 by Precious Meyer CNM No    Overview Signed  2020 11:28 AM by Precious Meyer CNM     See cardiology note from 6/3/20    Impression:  Single active female fetus at 32 wga.  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  There is prograde flow through the pulmonary valve, which is severely hypoplastic.  My suspicion is that the saturations will likely be ok after birth.  However, we will need to follow the sats in the NICU as the PDA closes.  If the saturations are good at this point, she can go home with her family and follow up with me in clinic, with an expected surgical palliation at 6 months of age.  If her saturations require supplemental pulmonary blood flow, then she will need a central shunt as a  - again, I do not think this is likely.     We talked about the possibility of a single gene mutation causing this disease, like DiGeorge syndrome and that we would assess for this with a chromosome microarray.  We discussed hypercyanotic spells associated with tetralogy and that this would necessitate a surgery.       I discussed with her that fetal echocardiography is insufficiently sensitive to rule out all septal defects, anomalies of pulmonary and systemic veins, arch anomalies, and some valvar abnormalities, nor can it ensure that the ductus arteriosus and foramen ovale will spontaneously close.      Recommendations:  1. Prenatal cardiac diagnosis: double outlet right ventricle - tetralogy of Fallot type  2. Any prenatal genetic diagnoses or other major associated abnormalities, conditions - no  3. Primary Fetal Cardiologist: Marie  Prenatal Recommendations:   1. Prenatal Cath MD Consult: No   2. Prenatal Congenital Heart Surgery Consult ? No   3. Follow up fetal cardiac evaluation in 2 weeks  Delivery and post tal recommendations as of last fetal visit:  1. Recommend delivery at tertiary care center such as [facility] where Pediatric Cardiology and  Congenital Heart Surgery care are immediately available  2. From fetal cardiac perspective, ok for vaginal delivery, preferably after 38-39 weeks gestation  3. Recommend scheduled weekday delivery during daytime hours so that all needed caretakers are immediately available  4. PGE should be available at bedside   5. Likely need for urgent intervention within first hours after delivery: No   6. Cardiology Consultant recommended to attend delivery: No   7. Transition to NICU   8. Cardiology consult: Yes  9.   Genetics recommendation: CMA  10.  surgical or cath intervention: hopefully nothing         Birth Center Risk Assessment: 1-management on labor and Delivery due to fetal cardiac anomaly 2/15/2020 by Precious Meyer CNM No    Overview Addendum 2020 11:29 AM by Precious Meyer CNM     Birth Center Risk Assessment: 1-management on labor and Delivery due to fetal cardiac anomaly    0- CNM management in ABC  1- CNM management on L&D  2- Consultation with OB to develop  plan of care  3- Collaborative CNM/OB management with delivery on L&D  4- Permanent referral of care to MD           Alcohol use affecting pregnanccy--possible heavy use in early pregnancy: NO CURRENT use 2020 by Ashly Piper CNM No    Overview Addendum 3/8/2020  6:20 PM by Ashly Piper CNM     -See [facility] notes:  1. Recommend a f/u ultrasound in about 5 wks to complete fetal anatomical survey, interval growth, and amniotic fluid volume assessment  2. Recommend f/u ultrasound at about 30-32 wks gestation for interval fetal growth, amniotic fluid volume, and Placental location with assessment of blood vessels in lower  uterine segment/cervix area:  -- Primary OB to insure appt scheduled  -- recommend subsequent ultrasound about 4-6 wks for interval fetal growth, amniotic fluid volume  3. Bleeding precautions given to patient  4. Patient did not report continued use of alcohol after the first trimester  5.  "Recommend Pediatrician be notified of patient's alcohol use in first trimester for management/monitoring of FAS per their recommendations/guidelines                   Delivery consents given today signed  Discussed plans for support people during labor - she plans to have Dario (significant other).  Pediatrician: Elva Seymour (does not have privileges at [facility]--understands staff group will care for baby then discharge care to her ped and cardiology/ etc.   Reviewed warning signs, normal FM, and how/when to call.      Follow-up: 1 week, call or present sooner PRN  Labor precautions reviewed and fkc encouraged.   Ashly Piper CNM, MSN  07/01/2020  8:13 PM  Realized after patient left the office--she has not had TDAP injection.  IT was discussed at prenatal visit previously and documented that patient "would think about it".  Note sent to patient over [facility].     "

## 2020-07-03 LAB — BACTERIA SPEC AEROBE CULT: NORMAL

## 2020-07-06 LAB — RPR SER QL: NORMAL

## 2020-07-07 ENCOUNTER — ROUTINE PRENATAL (OUTPATIENT)
Dept: OBSTETRICS AND GYNECOLOGY | Facility: CLINIC | Age: 40
End: 2020-07-07
Payer: MEDICAID

## 2020-07-07 ENCOUNTER — TELEPHONE (OUTPATIENT)
Dept: OBSTETRICS AND GYNECOLOGY | Facility: HOSPITAL | Age: 40
End: 2020-07-07

## 2020-07-07 ENCOUNTER — INITIAL CONSULT (OUTPATIENT)
Dept: MATERNAL FETAL MEDICINE | Facility: CLINIC | Age: 40
End: 2020-07-07
Payer: MEDICAID

## 2020-07-07 ENCOUNTER — PROCEDURE VISIT (OUTPATIENT)
Dept: MATERNAL FETAL MEDICINE | Facility: CLINIC | Age: 40
End: 2020-07-07
Payer: MEDICAID

## 2020-07-07 VITALS
WEIGHT: 192.69 LBS | SYSTOLIC BLOOD PRESSURE: 108 MMHG | BODY MASS INDEX: 28.44 KG/M2 | DIASTOLIC BLOOD PRESSURE: 74 MMHG

## 2020-07-07 VITALS
DIASTOLIC BLOOD PRESSURE: 72 MMHG | SYSTOLIC BLOOD PRESSURE: 104 MMHG | WEIGHT: 192.44 LBS | BODY MASS INDEX: 28.41 KG/M2

## 2020-07-07 DIAGNOSIS — O35.BXX0 FETAL CARDIAC ANOMALY COMPLICATING PREGNANCY, ANTEPARTUM, SINGLE GESTATION: ICD-10-CM

## 2020-07-07 DIAGNOSIS — O35.BXX0 ANOMALY OF HEART OF FETUS AFFECTING PREGNANCY, ANTEPARTUM, SINGLE OR UNSPECIFIED FETUS: Primary | ICD-10-CM

## 2020-07-07 DIAGNOSIS — Z34.90 PREGNANCY, UNSPECIFIED GESTATIONAL AGE: Primary | ICD-10-CM

## 2020-07-07 DIAGNOSIS — Z36.89 ENCOUNTER FOR ULTRASOUND TO ASSESS FETAL GROWTH: ICD-10-CM

## 2020-07-07 DIAGNOSIS — O36.5930 INTRAUTERINE GROWTH RESTRICTION (IUGR) AFFECTING CARE OF MOTHER, THIRD TRIMESTER, SINGLE OR UNSPECIFIED FETUS: ICD-10-CM

## 2020-07-07 DIAGNOSIS — Z34.93 PRENATAL CARE IN THIRD TRIMESTER: Primary | ICD-10-CM

## 2020-07-07 PROCEDURE — 76815 PR  US,PREGNANT UTERUS,LIMITED, 1/> FETUSES: ICD-10-PCS | Mod: 26,S$PBB,, | Performed by: OBSTETRICS & GYNECOLOGY

## 2020-07-07 PROCEDURE — 99999 PR PBB SHADOW E&M-EST. PATIENT-LVL II: CPT | Mod: PBBFAC,,, | Performed by: OBSTETRICS & GYNECOLOGY

## 2020-07-07 PROCEDURE — 76819 FETAL BIOPHYS PROFIL W/O NST: CPT | Mod: 26,S$PBB,, | Performed by: OBSTETRICS & GYNECOLOGY

## 2020-07-07 PROCEDURE — 99212 PR OFFICE/OUTPT VISIT, EST, LEVL II, 10-19 MIN: ICD-10-PCS | Mod: S$PBB,TH,, | Performed by: ADVANCED PRACTICE MIDWIFE

## 2020-07-07 PROCEDURE — 99214 OFFICE O/P EST MOD 30 MIN: CPT | Mod: GT,25,S$PBB,TH | Performed by: OBSTETRICS & GYNECOLOGY

## 2020-07-07 PROCEDURE — 99212 OFFICE O/P EST SF 10 MIN: CPT | Mod: PBBFAC,TH,25 | Performed by: OBSTETRICS & GYNECOLOGY

## 2020-07-07 PROCEDURE — 76819 FETAL BIOPHYS PROFIL W/O NST: CPT | Mod: PBBFAC | Performed by: OBSTETRICS & GYNECOLOGY

## 2020-07-07 PROCEDURE — 99999 PR PBB SHADOW E&M-EST. PATIENT-LVL II: CPT | Mod: PBBFAC,,, | Performed by: ADVANCED PRACTICE MIDWIFE

## 2020-07-07 PROCEDURE — 99214 PR OFFICE/OUTPT VISIT, EST, LEVL IV, 30-39 MIN: ICD-10-PCS | Mod: GT,25,S$PBB,TH | Performed by: OBSTETRICS & GYNECOLOGY

## 2020-07-07 PROCEDURE — 76820 UMBILICAL ARTERY ECHO: CPT | Mod: PBBFAC | Performed by: OBSTETRICS & GYNECOLOGY

## 2020-07-07 PROCEDURE — 76819 PR US, OB, FETAL BIOPHYSICAL, W/O NST: ICD-10-PCS | Mod: 26,S$PBB,, | Performed by: OBSTETRICS & GYNECOLOGY

## 2020-07-07 PROCEDURE — 99212 OFFICE O/P EST SF 10 MIN: CPT | Mod: S$PBB,TH,, | Performed by: ADVANCED PRACTICE MIDWIFE

## 2020-07-07 PROCEDURE — 99499 NO LOS: ICD-10-PCS | Mod: S$PBB,,, | Performed by: OBSTETRICS & GYNECOLOGY

## 2020-07-07 PROCEDURE — 99999 PR PBB SHADOW E&M-EST. PATIENT-LVL II: ICD-10-PCS | Mod: PBBFAC,,, | Performed by: OBSTETRICS & GYNECOLOGY

## 2020-07-07 PROCEDURE — 76815 OB US LIMITED FETUS(S): CPT | Mod: 26,S$PBB,, | Performed by: OBSTETRICS & GYNECOLOGY

## 2020-07-07 PROCEDURE — 76815 OB US LIMITED FETUS(S): CPT | Mod: PBBFAC | Performed by: OBSTETRICS & GYNECOLOGY

## 2020-07-07 PROCEDURE — 99212 OFFICE O/P EST SF 10 MIN: CPT | Mod: PBBFAC,27,25 | Performed by: ADVANCED PRACTICE MIDWIFE

## 2020-07-07 PROCEDURE — 76820 PR US, OB DOPPLER, FETAL UMBILICAL ARTERY ECHO: ICD-10-PCS | Mod: 26,S$PBB,, | Performed by: OBSTETRICS & GYNECOLOGY

## 2020-07-07 PROCEDURE — 99999 PR PBB SHADOW E&M-EST. PATIENT-LVL II: ICD-10-PCS | Mod: PBBFAC,,, | Performed by: ADVANCED PRACTICE MIDWIFE

## 2020-07-07 PROCEDURE — 99499 UNLISTED E&M SERVICE: CPT | Mod: S$PBB,,, | Performed by: OBSTETRICS & GYNECOLOGY

## 2020-07-07 PROCEDURE — 76820 UMBILICAL ARTERY ECHO: CPT | Mod: 26,S$PBB,, | Performed by: OBSTETRICS & GYNECOLOGY

## 2020-07-07 NOTE — PROGRESS NOTES
"  No chief complaint on file.      39 y.o. female  at 37w4d, by Estimated Date of Delivery: 20    Complaints today: none. Doing well today.  Reviewed TW lbs  Presents from [facility] today.  Dr. Cole calls me directly to inform delivery is recommended and SVE to be performed to help ascertain need for ripening versus not.  SVE 1/thick/ -3, discussed potential plan of care (cytotec with or without quijano bulb/ TBA with on call MD/ JOM team).  Patient denies further questions and "ready" for delivery.  She wants to see how far she can go without epidural, but open to anesthesia.   She understands delivery will happen in the OR>   Staff message to CONCHIS/ RODNEY and Dr. Aguilar as well.         ROS  OBSTETRICS:   Contractions: Nothing regular   Bleeding No   Loss of fluid No   Fetal mvmnt present  GASTRO:   Nausea No   Vomiting No      OB History    Para Term  AB Living   5 1 1 0 2 1   SAB TAB Ectopic Multiple Live Births   1 0 0 0        # Outcome Date GA Lbr Ponce/2nd Weight Sex Delivery Anes PTL Lv   5 Current            4             3 AB            2 SAB            1 Term                Dating reviewed  Allergies and problem list reviewed and updated  Medical and surgical history reviewed  Prenatal labs reviewed and updated    PHYSICAL EXAM  /72   Wt 87.3 kg (192 lb 7.4 oz)   LMP 2019 (Within Weeks)   BMI 28.41 kg/m²     GENERAL: No acute distress  HEENT: Normocephalic, atraumatic  NEURO: Alert and oriented x3  PSYCH: Normal mood and affect  PULMONARY: Non-labored respiration; no tachypnea  ABD: Soft, gravid, nontender.      ASSESSMENT AND PLAN    MARCIANO ALVARADO Problems (from 20 to present)     Problem Noted Resolved    Fetal cardiac anomaly complicating pregnancy, antepartum, single gestation 2020 by Precious Meyer CNM No    Overview Signed 2020 11:28 AM by Precious Meyer CNM     See cardiology note from 6/3/20    Impression:  Single active female " fetus at 32 wga.  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  There is prograde flow through the pulmonary valve, which is severely hypoplastic.  My suspicion is that the saturations will likely be ok after birth.  However, we will need to follow the sats in the NICU as the PDA closes.  If the saturations are good at this point, she can go home with her family and follow up with me in clinic, with an expected surgical palliation at 6 months of age.  If her saturations require supplemental pulmonary blood flow, then she will need a central shunt as a  - again, I do not think this is likely.     We talked about the possibility of a single gene mutation causing this disease, like DiGeorge syndrome and that we would assess for this with a chromosome microarray.  We discussed hypercyanotic spells associated with tetralogy and that this would necessitate a surgery.       I discussed with her that fetal echocardiography is insufficiently sensitive to rule out all septal defects, anomalies of pulmonary and systemic veins, arch anomalies, and some valvar abnormalities, nor can it ensure that the ductus arteriosus and foramen ovale will spontaneously close.      Recommendations:  1. Prenatal cardiac diagnosis: double outlet right ventricle - tetralogy of Fallot type  2. Any prenatal genetic diagnoses or other major associated abnormalities, conditions - no  3. Primary Fetal Cardiologist: Marie  Prenatal Recommendations:   1. Prenatal Cath MD Consult: No   2. Prenatal Congenital Heart Surgery Consult ? No   3. Follow up fetal cardiac evaluation in 2 weeks  Delivery and post tal recommendations as of last fetal visit:  1. Recommend delivery at tertiary care center such as [facility] where Pediatric Cardiology and Congenital Heart Surgery care are immediately available  2. From fetal cardiac perspective, ok for vaginal delivery,  preferably after 38-39 weeks gestation  3. Recommend scheduled weekday delivery during daytime hours so that all needed caretakers are immediately available  4. PGE should be available at bedside   5. Likely need for urgent intervention within first hours after delivery: No   6. Cardiology Consultant recommended to attend delivery: No   7. Transition to NICU   8. Cardiology consult: Yes  9.   Genetics recommendation: CMA  10.  surgical or cath intervention: hopefully nothing         Birth Center Risk Assessment: 1-management on labor and Delivery due to fetal cardiac anomaly 2/15/2020 by Precious Meyer CNM No    Overview Addendum 2020 11:29 AM by Precious Meyer CNM     Birth Center Risk Assessment: 1-management on labor and Delivery due to fetal cardiac anomaly    0- CNM management in ABC  1- CNM management on L&D  2- Consultation with OB to develop  plan of care  3- Collaborative CNM/OB management with delivery on L&D  4- Permanent referral of care to MD           Alcohol use affecting pregnanccy--possible heavy use in early pregnancy: NO CURRENT use 2020 by Ashly Piper CNM No    Overview Addendum 3/8/2020  6:20 PM by Ashly Piper CNM     -See M notes:  1. Recommend a f/u ultrasound in about 5 wks to complete fetal anatomical survey, interval growth, and amniotic fluid volume assessment  2. Recommend f/u ultrasound at about 30-32 wks gestation for interval fetal growth, amniotic fluid volume, and Placental location with assessment of blood vessels in lower  uterine segment/cervix area:  -- Primary OB to insure appt scheduled  -- recommend subsequent ultrasound about 4-6 wks for interval fetal growth, amniotic fluid volume  3. Bleeding precautions given to patient  4. Patient did not report continued use of alcohol after the first trimester  5. Recommend Pediatrician be notified of patient's alcohol use in first trimester for management/monitoring of FAS per their  recommendations/guidelines                   Delivery consents signed today and planned induction for tomorrow night (scheduled and message left for patient) CNM team notified.  Discussed plans for support people during labor - she plans to have partner.    Reviewed warning signs, normal FM, and how/when to call.    Ashly Piper CNM, MSN  07/07/2020  6:17 PM

## 2020-07-07 NOTE — PROGRESS NOTES
Indication  ========    F/U Consultation fetal heart defect, BPP, UA dopplers    History  ======    General History  Other: Fetal cardiac defect: Double outlet right ventricle (tetralogy of Fallot type) with subaortic VSD and PS. Large malalignment type VSD in  the perimembranous area  alcohol exposure in 1st trimester  AMA  Previous Outcomes   3  Para 1  Risk Factors  History risk factors: AMA  Details: Alcohol exposure in 1st trimester  Details: former smoker  Details: Anxiety/Panic attacks    Method  ======    Transabdominal ultrasound examination, 2D Color Doppler, Voluson E10. View: Good view    Pregnancy  =========    Lemus pregnancy. Number of fetuses: 1    Dating  ======    Assigned: based on ultrasound (AC, BPD, Femur, HC), selected on 2020  Assigned GA 37 w + 4 d  Assigned MARBELLA: 2020  Pregnancy length 280 d    General Evaluation  ==============    Cardiac activity present.  bpm.  Fetal movements visualized.  Presentation cephalic.  Placenta Placental site: posterior.    Amniotic Fluid Assessment  =====================    Amount of AF: normal amount  MVP 5.2 cm    Biophysical Profile  ==============    2: Fetal breathing movements  2: Gross body movements  2: Fetal tone  2: Amniotic fluid volume  8 Biophysical profile score    Fetal Doppler  ===========    Arterial  Umbilical A PI 0.80  52%        Dick    Umbilical A RI 0.56  53%        Dick    Umbilical A PS -56.79 cm/s    Umbilical A ED -24.35 cm/s  Umbilical A TAmax -39.04 cm/s    Umbilical A MD -23.83 cm/s  Umbilical A S / D 2.27  47%        Dick    Umbilical A  bpm    Consultation  ==========    Type: ultrasound  The patient has severe IUGR diagnosed last week. There is a known complex fetal cardiac defect (double outlet right ventricle-TOF type). I  discussed with the patient that I had communicated with Dr. Salazar and he indicated that we should treat the patient as appropriate in what we  would with  noncardiac Fetal patient. I discussed with her that typically we would deliver between 37 and 37 and 6 weeks gestation with this  type of IUGR. I also discussed with the patient that there are limitations of our NICU capacity currently and that we would ideally time to time  delivery during the day. I discussed with the fetal stomach was enlarged today and approached the midline. There is no clear evidence of a  double bubble nor evidence of polyhydramnios nor pathologic bowel dilation.  feeding and stooling will need to be monitored. I spoke  with Dr. Hidalgo in neonatology and it is recommended that her induction be scheduled for Thursday ideally given the current situation. I  spoke with Ms. Ashly Piper who will see the patient and will check her cervix and schedule her induction for the appropriate time on Thursday  and notify the patient that the timing may be moved. The patient is aware of potential etiologies of a large stomach. The patient reportedly had  low risk NIPT. Cardiology indicated the  will be tested for DiGeorge postnatally. Patient is aware of potential surgical needs. Discussed  with Dr. Mccauley. Antepartum resident will communicate with NICU staff tomorrow to confirm induction scheduling. Patient advised of risks of  delivery prior to 39 weeks. Risks of IUGR reviewed. 25 min was spent face-to-face time with greater than half that time spent counseling  coordination of care.    Impression  =========    Lemus live intrauterine pregnancy.  Known severe IUGR.  BPP 8/8.  Normal UA doppler S/D ratio.  Known complex fetal cardiac defect.  Fetal stomach is enlarged and in some images comes just to the midline. There is no double bubble. Unable to assess rectum well due to  shadowing. No evidence of pathologic bowel dilation.      Recommendation  ==============    Patient to go to prenatal visit.  Ms. Feliz CNM will schedule patient for induction Thursday and inform patient that  the induction time may be changed based upon L and  D/NICU .  Primary ob to notify peds of fetal cardiac and stomach findings.  See note above.  No further MFM visits have been scheduled.

## 2020-07-08 ENCOUNTER — ANESTHESIA (OUTPATIENT)
Dept: OBSTETRICS AND GYNECOLOGY | Facility: OTHER | Age: 40
End: 2020-07-08
Payer: MEDICAID

## 2020-07-08 ENCOUNTER — HOSPITAL ENCOUNTER (INPATIENT)
Facility: OTHER | Age: 40
LOS: 5 days | Discharge: HOME | End: 2020-07-13
Attending: OBSTETRICS & GYNECOLOGY | Admitting: OBSTETRICS & GYNECOLOGY
Payer: MEDICAID

## 2020-07-08 ENCOUNTER — ANESTHESIA EVENT (OUTPATIENT)
Dept: OBSTETRICS AND GYNECOLOGY | Facility: OTHER | Age: 40
End: 2020-07-08
Payer: MEDICAID

## 2020-07-08 DIAGNOSIS — Z98.891 S/P CESAREAN SECTION: Primary | ICD-10-CM

## 2020-07-08 DIAGNOSIS — Z34.93 PRENATAL CARE IN THIRD TRIMESTER: ICD-10-CM

## 2020-07-08 PROBLEM — O36.5930 POOR FETAL GROWTH AFFECTING MANAGEMENT OF MOTHER IN THIRD TRIMESTER: Status: ACTIVE | Noted: 2020-07-08

## 2020-07-08 LAB
ABO + RH BLD: NORMAL
BASOPHILS # BLD AUTO: 0.04 K/UL (ref 0–0.2)
BASOPHILS NFR BLD: 0.4 % (ref 0–1.9)
BLD GP AB SCN CELLS X3 SERPL QL: NORMAL
DIFFERENTIAL METHOD: ABNORMAL
EOSINOPHIL # BLD AUTO: 0.2 K/UL (ref 0–0.5)
EOSINOPHIL NFR BLD: 1.7 % (ref 0–8)
ERYTHROCYTE [DISTWIDTH] IN BLOOD BY AUTOMATED COUNT: 12.7 % (ref 11.5–14.5)
HCT VFR BLD AUTO: 33.2 % (ref 37–48.5)
HGB BLD-MCNC: 11 G/DL (ref 12–16)
IMM GRANULOCYTES # BLD AUTO: 0.08 K/UL (ref 0–0.04)
IMM GRANULOCYTES NFR BLD AUTO: 0.8 % (ref 0–0.5)
LYMPHOCYTES # BLD AUTO: 1.8 K/UL (ref 1–4.8)
LYMPHOCYTES NFR BLD: 19.1 % (ref 18–48)
MCH RBC QN AUTO: 29.9 PG (ref 27–31)
MCHC RBC AUTO-ENTMCNC: 33.1 G/DL (ref 32–36)
MCV RBC AUTO: 90 FL (ref 82–98)
MONOCYTES # BLD AUTO: 0.8 K/UL (ref 0.3–1)
MONOCYTES NFR BLD: 8.1 % (ref 4–15)
NEUTROPHILS # BLD AUTO: 6.7 K/UL (ref 1.8–7.7)
NEUTROPHILS NFR BLD: 69.9 % (ref 38–73)
NRBC BLD-RTO: 0 /100 WBC
PLATELET # BLD AUTO: 215 K/UL (ref 150–350)
PMV BLD AUTO: 9.6 FL (ref 9.2–12.9)
RBC # BLD AUTO: 3.68 M/UL (ref 4–5.4)
SARS-COV-2 RDRP RESP QL NAA+PROBE: NEGATIVE
WBC # BLD AUTO: 9.58 K/UL (ref 3.9–12.7)

## 2020-07-08 PROCEDURE — 63600175 PHARM REV CODE 636 W HCPCS: Performed by: ADVANCED PRACTICE MIDWIFE

## 2020-07-08 PROCEDURE — 11000001 HC ACUTE MED/SURG PRIVATE ROOM

## 2020-07-08 PROCEDURE — 59200 INSERT CERVICAL DILATOR: CPT

## 2020-07-08 PROCEDURE — U0002 COVID-19 LAB TEST NON-CDC: HCPCS

## 2020-07-08 PROCEDURE — 85025 COMPLETE CBC W/AUTO DIFF WBC: CPT

## 2020-07-08 PROCEDURE — 72100002 HC LABOR CARE, 1ST 8 HOURS

## 2020-07-08 PROCEDURE — 86901 BLOOD TYPING SEROLOGIC RH(D): CPT

## 2020-07-08 RX ORDER — OXYTOCIN/RINGER'S LACTATE 30/500 ML
2 PLASTIC BAG, INJECTION (ML) INTRAVENOUS CONTINUOUS
Status: DISCONTINUED | OUTPATIENT
Start: 2020-07-08 | End: 2020-07-10

## 2020-07-08 RX ORDER — TERBUTALINE SULFATE 1 MG/ML
0.25 INJECTION SUBCUTANEOUS
Status: DISCONTINUED | OUTPATIENT
Start: 2020-07-08 | End: 2020-07-10

## 2020-07-08 RX ORDER — SODIUM CHLORIDE 9 MG/ML
INJECTION, SOLUTION INTRAVENOUS
Status: DISCONTINUED | OUTPATIENT
Start: 2020-07-08 | End: 2020-07-10

## 2020-07-08 RX ORDER — SODIUM CHLORIDE, SODIUM LACTATE, POTASSIUM CHLORIDE, CALCIUM CHLORIDE 600; 310; 30; 20 MG/100ML; MG/100ML; MG/100ML; MG/100ML
INJECTION, SOLUTION INTRAVENOUS CONTINUOUS
Status: DISCONTINUED | OUTPATIENT
Start: 2020-07-08 | End: 2020-07-10

## 2020-07-08 RX ORDER — SIMETHICONE 80 MG
1 TABLET,CHEWABLE ORAL 4 TIMES DAILY PRN
Status: DISCONTINUED | OUTPATIENT
Start: 2020-07-08 | End: 2020-07-10

## 2020-07-08 RX ORDER — OXYTOCIN/RINGER'S LACTATE 30/500 ML
95 PLASTIC BAG, INJECTION (ML) INTRAVENOUS ONCE
Status: DISCONTINUED | OUTPATIENT
Start: 2020-07-08 | End: 2020-07-10

## 2020-07-08 RX ORDER — ONDANSETRON 8 MG/1
8 TABLET, ORALLY DISINTEGRATING ORAL EVERY 8 HOURS PRN
Status: DISCONTINUED | OUTPATIENT
Start: 2020-07-08 | End: 2020-07-10

## 2020-07-08 RX ORDER — OXYTOCIN/RINGER'S LACTATE 30/500 ML
334 PLASTIC BAG, INJECTION (ML) INTRAVENOUS ONCE
Status: DISCONTINUED | OUTPATIENT
Start: 2020-07-08 | End: 2020-07-10

## 2020-07-08 RX ORDER — CALCIUM CARBONATE 200(500)MG
500 TABLET,CHEWABLE ORAL 3 TIMES DAILY PRN
Status: DISCONTINUED | OUTPATIENT
Start: 2020-07-08 | End: 2020-07-10

## 2020-07-08 RX ADMIN — SODIUM CHLORIDE, SODIUM LACTATE, POTASSIUM CHLORIDE, AND CALCIUM CHLORIDE: 600; 310; 30; 20 INJECTION, SOLUTION INTRAVENOUS at 08:07

## 2020-07-08 RX ADMIN — Medication 2 MILLI-UNITS/MIN: at 08:07

## 2020-07-09 PROCEDURE — 11000001 HC ACUTE MED/SURG PRIVATE ROOM

## 2020-07-09 RX ORDER — METHYLERGONOVINE MALEATE 0.2 MG/ML
200 INJECTION INTRAVENOUS
Status: DISCONTINUED | OUTPATIENT
Start: 2020-07-09 | End: 2020-07-10

## 2020-07-09 RX ORDER — OXYTOCIN/RINGER'S LACTATE 30/500 ML
95 PLASTIC BAG, INJECTION (ML) INTRAVENOUS ONCE
Status: DISCONTINUED | OUTPATIENT
Start: 2020-07-09 | End: 2020-07-10

## 2020-07-09 RX ORDER — OXYTOCIN/RINGER'S LACTATE 30/500 ML
2 PLASTIC BAG, INJECTION (ML) INTRAVENOUS CONTINUOUS
Status: DISCONTINUED | OUTPATIENT
Start: 2020-07-09 | End: 2020-07-10

## 2020-07-09 RX ORDER — CARBOPROST TROMETHAMINE 250 UG/ML
250 INJECTION, SOLUTION INTRAMUSCULAR
Status: DISCONTINUED | OUTPATIENT
Start: 2020-07-09 | End: 2020-07-10

## 2020-07-09 RX ORDER — MISOPROSTOL 200 UG/1
800 TABLET ORAL
Status: DISCONTINUED | OUTPATIENT
Start: 2020-07-09 | End: 2020-07-10

## 2020-07-09 NOTE — PROGRESS NOTES
"LABOR NOTE    S:  Pt resting comfortably in bed, no complaints. Reports she was able to nap some - appears calm and relaxed. Partner at bedside and supportive.     O: BP (!) 100/53   Pulse 62   Temp 98.2 °F (36.8 °C) (Oral)   Resp 16   Ht 5' 9" (1.753 m)   Wt 87.3 kg (192 lb 7.4 oz)   LMP 2019 (Within Weeks)   SpO2 98%   Breastfeeding No   BMI 28.42 kg/m²     GENERAL: Calm and appropriate affect  NEURO: Alert, oriented, normal speech  ABDOMEN: Nontender, Fundus palpates soft between UC's.  FHT: Baseline 130, moderate BTBV, positive accels, no decels. Cat 1, reassuring.  CTX: Irregular  SVE: Tension applied - quijano balloon still in place      ASSESSMENT:   39 y.o.  IUP at 37w6d, FHT reassuring/ Cat 1    Patient Active Problem List   Diagnosis    Alcohol use affecting pregnanccy--possible heavy use in early pregnancy: NO CURRENT use    Pregnancy    Anxiety    Panic attack    Birth Center Risk Assessment: 1-management on labor and Delivery due to fetal cardiac anomaly    Cystic thyroid nodule    Low-lying placenta in first trimester    Fetal cardiac anomaly complicating pregnancy, antepartum, single gestation    Indication for care in labor or delivery    Poor fetal growth affecting management of mother in third trimester         PLAN:  Continue close Maternal/Fetal monitoring  Pitocin Augmentation per protocol - currently infusing at 8mu  Recheck 2 hours or PRN  Epidural per anesthesia at pt's request      Nichelle Gonzalez CNM    "

## 2020-07-09 NOTE — ANESTHESIA PREPROCEDURE EVALUATION
[facility]  Anesthesia Pre-Operative Evaluation         Patient Name: Sonal Simms  YOB: 1980  MRN: [id number]    2020      Sonal Simms is a 39 y.o. female  @ 37w5d who presents for IOL. This IUP is complicated by Hx of possible heavy alcohol use in early pregnancy (no current use), anxiety, and fetal cardiac anomalies. She denies Hx of HTN, asthma, back surgery, scoliosis, or bleeding disorders. She denies issues with previous neuraxial anesthesia. She is undecided about getting an epidural.       OB History    Para Term  AB Living   5 1 1 0 2 1   SAB TAB Ectopic Multiple Live Births   1 0 0 0        # Outcome Date GA Lbr Ponce/2nd Weight Sex Delivery Anes PTL Lv   5 Current            4             3 AB            2 SAB            1 Term                Review of patient's allergies indicates:  No Known Allergies    Wt Readings from Last 1 Encounters:   20 1911 87.3 kg (192 lb 7.4 oz)       BP Readings from Last 3 Encounters:   20 120/70   20 104/72   20 108/74       Patient Active Problem List   Diagnosis    Alcohol use affecting pregnanccy--possible heavy use in early pregnancy: NO CURRENT use    Pregnancy    Anxiety    Panic attack    Birth Center Risk Assessment: 1-management on labor and Delivery due to fetal cardiac anomaly    Cystic thyroid nodule    Low-lying placenta in first trimester    Fetal cardiac anomaly complicating pregnancy, antepartum, single gestation    Indication for care in labor or delivery    Poor fetal growth affecting management of mother in third trimester       Past Surgical History:   Procedure Laterality Date    ANTERIOR CRUCIATE LIGAMENT REPAIR      age 24       Social History     Socioeconomic History    Marital status:      Spouse name: Not on file    Number of children: Not on file    Years of education: Not on file    Highest education level: Not on file   Occupational  History    Not on file   Social Needs    Financial resource strain: Not on file    Food insecurity     Worry: Not on file     Inability: Not on file    Transportation needs     Medical: Not on file     Non-medical: Not on file   Tobacco Use    Smoking status: Former Smoker     Types: Cigarettes    Tobacco comment: social smoker 2 cigs/ week quit in 2018   Substance and Sexual Activity    Alcohol use: Not Currently     Frequency: 4 or more times a week     Drinks per session: 7 to 9     Binge frequency: Weekly     Comment: possible ETOH during early pregancy (didn't know was pregnant)    Drug use: Not Currently     Types: Cocaine, Marijuana, MDMA (Ecstacy), LSD     Comment: no recent drug use, in past used above substances    Sexual activity: Yes     Partners: Male, Female     Birth control/protection: None     Comment: Dario- currently monogamous   Lifestyle    Physical activity     Days per week: Not on file     Minutes per session: Not on file    Stress: To some extent   Relationships    Social connections     Talks on phone: Not on file     Gets together: Not on file     Attends Synagogue service: Not on file     Active member of club or organization: Not on file     Attends meetings of clubs or organizations: Not on file     Relationship status: Not on file   Other Topics Concern    Not on file   Social History Narrative    Dario-current partner (has no children),  in last year and admits to very traumatic/ stressful year including the suicide of very close friend, laid off from job and divorce.--started drinking heavily during all of the stressful events and subsequently found out liver enzymes increased.     Complete surprise pregnancy-thought she was menopausal (no period for 5+months during heavy drinking)          Chemistry        Component Value Date/Time     02/13/2020 1115    K 3.8 02/13/2020 1115     02/13/2020 1115    CO2 23 02/13/2020 1115    BUN 6 02/13/2020 1115     CREATININE 0.7 02/13/2020 1115    GLU 80 02/13/2020 1115        Component Value Date/Time    CALCIUM 9.5 02/13/2020 1115    ALKPHOS 58 02/13/2020 1115    AST 18 02/13/2020 1115    ALT 13 02/13/2020 1115    BILITOT 0.4 02/13/2020 1115    ESTGFRAFRICA >60 02/13/2020 1115    EGFRNONAA >60 02/13/2020 1115            Lab Results   Component Value Date    WBC 9.58 07/08/2020    HGB 11.0 (L) 07/08/2020    HCT 33.2 (L) 07/08/2020    MCV 90 07/08/2020     07/08/2020       No results for input(s): PT, INR, PROTIME, APTT in the last 72 hours.          Anesthesia Evaluation    I have reviewed the Patient Summary Reports.    I have reviewed the Nursing Notes.    I have reviewed the Medications.     Review of Systems  Anesthesia Hx:  No problems with previous Anesthesia  Denies Family Hx of Anesthesia complications.    Cardiovascular:  Cardiovascular Normal Exercise tolerance: good     Pulmonary:  Pulmonary Normal    Renal/:  Renal/ Normal     Hepatic/GI:  Hepatic/GI Normal    Neurological:  Neurology Normal    Endocrine:  Endocrine Normal        Physical Exam  General:  Well nourished    Airway/Jaw/Neck:  Airway Findings: Mouth Opening: Normal Tongue: Normal  Mallampati: III  Improves to II with phonation.  TM Distance: Normal, at least 6 cm  Jaw/Neck Findings:  Neck ROM: Normal ROM      Dental:  Dental Findings: In tact   Chest/Lungs:  Chest/Lungs Clear    Heart/Vascular:  Heart Findings: Normal       Mental Status:  Mental Status Findings:  Cooperative, Alert and Oriented         Anesthesia Plan  Type of Anesthesia, risks & benefits discussed:  Anesthesia Type:  CSE, epidural, general, spinal  Patient's Preference:   Intra-op Monitoring Plan: standard ASA monitors  Intra-op Monitoring Plan Comments:   Post Op Pain Control Plan: per primary service following discharge from PACU, IV/PO Opioids PRN, multimodal analgesia, epidural analgesia and intrathecal opioid  Post Op Pain Control Plan Comments:   Induction:   IV  and rapid sequence  Beta Blocker:  Patient is not currently on a Beta-Blocker (No further documentation required).       Informed Consent: Patient understands risks and agrees with Anesthesia plan.  Questions answered. Anesthesia consent signed with patient.  ASA Score: 2     Day of Surgery Review of History & Physical:    H&P update referred to the provider.     Anesthesia Plan Notes: Discussed neuraxial anesthesia and GETA        Ready For Surgery From Anesthesia Perspective.

## 2020-07-09 NOTE — HPI
"History of Present Illness:   Sonal Simms is a 39 y.o. female  at 37w5d with Estimated Date of Delivery: 20 based on 14w ultrasound who presents to Labor and Delivery accompanied by Dario, her partner. Expecting a girl, "Brigitte".    Reports no regular uterine contractions. active fetal movement, No vaginal bleeding , No loss of fluid.     Last ate a rice krispy treat at 1630, last drank water at bedside.     This pregnancy has been complicated by:   Alcohol use affecting pregnanccy--possible heavy use in early pregnancy: NO CURRENT use    Anxiety    Panic attack    Cystic thyroid nodule    Low-lying placenta in first trimester    Fetal cardiac anomaly complicating pregnancy, antepartum, single gestation        Presentation: Cephalic  Estimated Fetal Weight: 5lb    Birth Center Risk Assessment: 1-management on labor and Delivery    0- CNM management in ABC  1- CNM management on L&D  2- Consultation with OB to develop  plan of care  3- Collaborative CNM/OB management with delivery on L&D   4- Permanent referral of care to MD    "

## 2020-07-09 NOTE — SUBJECTIVE & OBJECTIVE
Obstetric HPI:  Patient resting comfortably - denies pain currently. Considering epidural as labor progresses    OB History    Para Term  AB Living   5 1 1 0 2 1   SAB TAB Ectopic Multiple Live Births   1 0 0 0 0      # Outcome Date GA Lbr Ponce/2nd Weight Sex Delivery Anes PTL Lv   5 Current            4             3 AB            2 SAB            1 Term              Past Medical History:   Diagnosis Date    Anxiety     Elevated liver enzymes     secondary to alcohol use    Panic attack 2020    Patient states that this was due to exhaustion after having difficulty falling asleep due to anxiety     Past Surgical History:   Procedure Laterality Date    ANTERIOR CRUCIATE LIGAMENT REPAIR      age 24       PTA Medications   Medication Sig    hydrOXYzine pamoate (VISTARIL) 50 MG Cap Take 1 capsule (50 mg total) by mouth every 6 (six) hours as needed.    multivitamin capsule Take 1 capsule by mouth once daily.       Review of patient's allergies indicates:  No Known Allergies     Family History     Problem Relation (Age of Onset)    Obesity Father    Thyroid cancer Sister        Tobacco Use    Smoking status: Former Smoker     Types: Cigarettes    Tobacco comment: social smoker 2 cigs/ week quit in 2018   Substance and Sexual Activity    Alcohol use: Not Currently     Frequency: 4 or more times a week     Drinks per session: 7 to 9     Binge frequency: Weekly     Comment: possible ETOH during early pregancy (didn't know was pregnant)    Drug use: Not Currently     Types: Cocaine, Marijuana, MDMA (Ecstacy), LSD     Comment: no recent drug use, in past used above substances    Sexual activity: Yes     Partners: Male, Female     Birth control/protection: None     Comment: Dario- currently monogamous     Review of Systems   Constitutional: Negative for activity change, chills, fever and unexpected weight change.   Eyes: Negative for visual disturbance.   Respiratory: Negative for cough  and shortness of breath.    Cardiovascular: Negative for chest pain.   Gastrointestinal: Negative for abdominal pain, constipation, diarrhea, nausea and vomiting.   Genitourinary: Negative for dysuria, genital sores, vaginal bleeding, vaginal discharge and vaginal odor.   Integumentary:  Negative for rash.   Neurological: Negative for syncope and headaches.   Psychiatric/Behavioral: Negative for depression.      Objective:     Vital Signs (Most Recent):  Temp: 97.2 °F (36.2 °C) (07/08/20 1911)  Pulse: 72 (07/08/20 2005)  Resp: 16 (07/08/20 1911)  BP: 120/69 (07/08/20 1911)  SpO2: 100 % (07/08/20 2005) Vital Signs (24h Range):  Temp:  [97.2 °F (36.2 °C)] 97.2 °F (36.2 °C)  Pulse:  [69-84] 72  Resp:  [16] 16  SpO2:  [96 %-100 %] 100 %  BP: (120)/(69) 120/69        There is no height or weight on file to calculate BMI.    FHT: Baseline 130, moderate BTBV, positive accels, no decels. Cat 1 (reassuring)  TOCO:  Occasional, irregular UCs    Physical Exam:   Constitutional: She is oriented to person, place, and time. She appears well-developed and well-nourished.    HENT:   Head: Normocephalic and atraumatic.     Neck: Normal range of motion.    Cardiovascular: Normal rate, regular rhythm and normal heart sounds.     Pulmonary/Chest: Effort normal and breath sounds normal. No respiratory distress. She has no decreased breath sounds.        Abdominal: Soft. Distention: Gravid. There is no abdominal tenderness. There is no guarding.             Musculoskeletal: Normal range of motion and moves all extremeties.       Neurological: She is alert and oriented to person, place, and time.    Skin: Skin is warm and dry. No rash noted. No erythema.    Psychiatric: She has a normal mood and affect. Her behavior is normal.       Cervix:  Dilation:  1  Effacement:  40%  Station: -3  Presentation: Vertex per Leopold's & VE     Significant Labs:  Lab Results   Component Value Date    [facility] 02/13/2020    HEPBSAG Negative  02/13/2020    STREPBCULT No Group B Streptococcus isolated 07/01/2020       I have personallly reviewed all pertinent lab results from the last 24 hours.

## 2020-07-09 NOTE — HOSPITAL COURSE
7/8/20 - Admitted to L&D for induction of labor. Sherman balloon inserted.  7/9/2020- Sherman bulb came out spontaneously @ ~0800 when up to BR  SVE @ 0820- 3-4, 50%, -1 vtx

## 2020-07-09 NOTE — H&P
"[facility]  Obstetrics  History & Physical    Patient Name: Sonal Simms  MRN: [id number]  Admission Date: 2020  Primary Care Provider: Primary Doctor No    Subjective:     Principal Problem:Indication for care in labor or delivery      History of Present Illness:   Sonal Simms is a 39 y.o. female  at 37w5d with Estimated Date of Delivery: 20 based on 14w ultrasound who presents to Labor and Delivery accompanied by Dario, her partner. Expecting a girl, "Brigitte".    Reports no regular uterine contractions. active fetal movement, No vaginal bleeding , No loss of fluid.     Last ate a rice krispy treat at 1630, last drank water at bedside.     This pregnancy has been complicated by:   Alcohol use affecting pregnanccy--possible heavy use in early pregnancy: NO CURRENT use    Anxiety    Panic attack    Cystic thyroid nodule    Low-lying placenta in first trimester    Fetal cardiac anomaly complicating pregnancy, antepartum, single gestation        Presentation: Cephalic  Estimated Fetal Weight: 5lb    Birth Center Risk Assessment: 1-management on labor and Delivery    0- CNM management in ABC  1- CNM management on L&D  2- Consultation with OB to develop  plan of care  3- Collaborative CNM/OB management with delivery on L&D   4- Permanent referral of care to MD      Obstetric HPI:  Patient resting comfortably - denies pain currently. Considering epidural as labor progresses    OB History    Para Term  AB Living   5 1 1 0 2 1   SAB TAB Ectopic Multiple Live Births   1 0 0 0 0      # Outcome Date GA Lbr Ponce/2nd Weight Sex Delivery Anes PTL Lv   5 Current            4             3 AB            2 SAB            1 Term              Past Medical History:   Diagnosis Date    Anxiety     Elevated liver enzymes     secondary to alcohol use    Panic attack 2020    Patient states that this was due to exhaustion after having difficulty falling asleep due to " anxiety     Past Surgical History:   Procedure Laterality Date    ANTERIOR CRUCIATE LIGAMENT REPAIR      age 24       PTA Medications   Medication Sig    hydrOXYzine pamoate (VISTARIL) 50 MG Cap Take 1 capsule (50 mg total) by mouth every 6 (six) hours as needed.    multivitamin capsule Take 1 capsule by mouth once daily.       Review of patient's allergies indicates:  No Known Allergies     Family History     Problem Relation (Age of Onset)    Obesity Father    Thyroid cancer Sister        Tobacco Use    Smoking status: Former Smoker     Types: Cigarettes    Tobacco comment: social smoker 2 cigs/ week quit in 2018   Substance and Sexual Activity    Alcohol use: Not Currently     Frequency: 4 or more times a week     Drinks per session: 7 to 9     Binge frequency: Weekly     Comment: possible ETOH during early pregancy (didn't know was pregnant)    Drug use: Not Currently     Types: Cocaine, Marijuana, MDMA (Ecstacy), LSD     Comment: no recent drug use, in past used above substances    Sexual activity: Yes     Partners: Male, Female     Birth control/protection: None     Comment: Adrio- currently monogamous     Review of Systems   Constitutional: Negative for activity change, chills, fever and unexpected weight change.   Eyes: Negative for visual disturbance.   Respiratory: Negative for cough and shortness of breath.    Cardiovascular: Negative for chest pain.   Gastrointestinal: Negative for abdominal pain, constipation, diarrhea, nausea and vomiting.   Genitourinary: Negative for dysuria, genital sores, vaginal bleeding, vaginal discharge and vaginal odor.   Integumentary:  Negative for rash.   Neurological: Negative for syncope and headaches.   Psychiatric/Behavioral: Negative for depression.      Objective:     Vital Signs (Most Recent):  Temp: 97.2 °F (36.2 °C) (07/08/20 1911)  Pulse: 72 (07/08/20 2005)  Resp: 16 (07/08/20 1911)  BP: 120/69 (07/08/20 1911)  SpO2: 100 % (07/08/20 2005) Vital Signs  (24h Range):  Temp:  [97.2 °F (36.2 °C)] 97.2 °F (36.2 °C)  Pulse:  [69-84] 72  Resp:  [16] 16  SpO2:  [96 %-100 %] 100 %  BP: (120)/(69) 120/69        There is no height or weight on file to calculate BMI.    FHT: Baseline 130, moderate BTBV, positive accels, no decels. Cat 1 (reassuring)  TOCO:  Occasional, irregular UCs    Physical Exam:   Constitutional: She is oriented to person, place, and time. She appears well-developed and well-nourished.    HENT:   Head: Normocephalic and atraumatic.     Neck: Normal range of motion.    Cardiovascular: Normal rate, regular rhythm and normal heart sounds.     Pulmonary/Chest: Effort normal and breath sounds normal. No respiratory distress. She has no decreased breath sounds.        Abdominal: Soft. Distention: Gravid. There is no abdominal tenderness. There is no guarding.             Musculoskeletal: Normal range of motion and moves all extremeties.       Neurological: She is alert and oriented to person, place, and time.    Skin: Skin is warm and dry. No rash noted. No erythema.    Psychiatric: She has a normal mood and affect. Her behavior is normal.       Cervix:  Dilation:  1  Effacement:  40%  Station: -3  Presentation: Vertex per Leopold's & VE     Significant Labs:  Lab Results   Component Value Date    GROUPTRH O POS 2020    HEPBSAG Negative 2020    STREPBCULT No Group B Streptococcus isolated 2020       I have personallly reviewed all pertinent lab results from the last 24 hours.    Assessment/Plan:     39 y.o. female  at 37w5d for:    * Indication for care in labor or delivery  - Admit to L&D  - Consents signed  - Cephalic per Leopold's and VE  - Obtain IV access  - Type & Screen and CBC  - CEFM  - Extensive discussion regarding plan of care, all questions answered  - Plan delivery in OR with NICU in attendance  - Plan quijano balloon IOL (inserted with pt's consent), followed by low dose Pitocin for cervical ripening.    Fetal cardiac  anomaly complicating pregnancy, antepartum, single gestation  Fetus with likely Tetralogy of Fallot  - CEFM  - NICU in attendance at delivery  - Plan for delivery in OR    Poor fetal growth affecting management of mother in third trimester  - Infant in 3%ile per [facility] ultrasound one week ago    Alcohol use affecting pregnanccy--possible heavy use in early pregnancy: NO CURRENT use  Will notify Peds at delivery for further management/monitoring of possible FAS        Nichelle Gonzalez CNM  Obstetrics  [facility]

## 2020-07-09 NOTE — PROGRESS NOTES
[facility]  Obstetrics  Labor Progress Note    Patient Name: Sonal Simms  MRN: [id number]  Admission Date: 2020  Hospital Length of Stay: 1 days  Attending Physician: Bettie Palacios MD  Primary Care Provider: Primary Doctor No    Subjective:     Principal Problem:Indication for care in labor or delivery    Hospital Course:  20 - Admitted to L&D for induction of labor. Quijano balloon inserted.  2020- Quijano bulb came out spontaneously @ ~0800 when up to BR  SVE @ 0820- 3-4, 50%, -1 vtx      Interval History:  Sonal is a 39 y.o.  at 37w6d. She is doing well. She reports that quijano bulb came out @ ~0800. Only having mild contratcions/crramps.    Objective:     Vital Signs (Most Recent):  Temp: 98.2 °F (36.8 °C) (20 0457)  Pulse: (!) 57 (20 0730)  Resp: 16 (20 1911)  BP: (!) 107/57 (20 0730)  SpO2: 97 % (20 0700) Vital Signs (24h Range):  Temp:  [97.2 °F (36.2 °C)-98.2 °F (36.8 °C)] 98.2 °F (36.8 °C)  Pulse:  [57-93] 57  Resp:  [16] 16  SpO2:  [95 %-100 %] 97 %  BP: ()/(53-77) 107/57     Weight: 87.3 kg (192 lb 7.4 oz)  Body mass index is 28.42 kg/m².    FHT: 130's Cat 1 (reassuring), Great BTBV and accels  TOCO:  Q 4 minutes, mild    Cervical Exam:  Dilation:  4  Effacement:  50%  Station: -1  Presentation: Vertex     Significant Labs:  Lab Results   Component Value Date    GROUPTRH O POS 2020    HEPBSAG Negative 2020    STREPBCULT No Group B Streptococcus isolated 2020       I have personallly reviewed all pertinent lab results from the last 24 hours.  Recent Lab Results       20  1950   20  1753        Baso # 0.04         Basophil% 0.4         Differential Method Automated         Eos # 0.2         Eosinophil% 1.7         Gran # (ANC) 6.7         Gran% 69.9         Group & Rh   O POS       Hematocrit 33.2         Hemoglobin 11.0         Immature Grans (Abs) 0.08  Comment:  Mild elevation in immature  granulocytes is non specific and   can be seen in a variety of conditions including stress response,   acute inflammation, trauma and pregnancy. Correlation with other   laboratory and clinical findings is essential.           Immature Granulocytes 0.8         INDIRECT MALINI   NEG       Lymph # 1.8         Lymph% 19.1         MCH 29.9         MCHC 33.1         MCV 90         Mono # 0.8         Mono% 8.1         MPV 9.6         nRBC 0         Platelets 215         RBC 3.68         RDW 12.7         SARS-CoV-2 RNA, Amplification, Qual     Negative  Comment:  This test utilizes isothermal nucleic acid amplification   technology to detect the SARS-CoV-2 RdRp nucleic acid segment.   The analytical sensitivity (limit of detection) is 125 genome   equivalents/mL.   A POSITIVE result implies infection with the SARS-CoV-2 virus;  the patient is presumed to be contagious.    A NEGATIVE result means that SARS-CoV-2 nucleic acids are not  present above the limit of detection. A NEGATIVE result should be   treated as presumptive. It does not rule out the possibility of   COVID-19 and should not be the sole basis for treatment decisions.   If COVID-19 is strongly suspected based on clinical and exposure   history, re-testing using an alternate molecular assay should be   considered.   This test is only for use under the Food and Drug   Administration s Emergency Use Authorization (EUA).   Commercial kits are provided by [employer].   Performance characteristics of the EUA have been independently  verified by [facility] Department of  Pathology and Laboratory Medicine.   _________________________________________________________________  The ID NOW COVID-19 Letter of Authorization, along with the   authorized Fact Sheet for Healthcare Providers, the authorized Fact  Sheet for Patients, and authorized labeling are available on the FDA    website:  www.fda.gov/MedicalDevices/Safety/EmergencySituations/qiu907848.htm       WBC 9.58                   Assessment/Plan:     39 y.o. female  at 37w6d for:    * Indication for care in labor or delivery  - Admit to L&D  - Consents signed  - Cephalic per Leopold's and VE  - Obtain IV access  - Type & Screen and CBC  - CEFM  - Extensive discussion regarding plan of care, all questions answered  - Plan delivery in OR with NICU in attendance  - Plan quijano balloon IOL (inserted with pt's consent), followed by low dose Pitocin for cervical ripening.    Poor fetal growth affecting management of mother in third trimester  - Infant in 3%ile per [facility] ultrasound one week ago    Fetal cardiac anomaly complicating pregnancy, antepartum, single gestation  Fetus with likely Tetralogy of Fallot  - CEFM  - NICU in attendance at delivery  - Plan for delivery in OR    Alcohol use affecting pregnanccy--possible heavy use in early pregnancy: NO CURRENT use  Will notify Peds at delivery for further management/monitoring of possible FAS    Expectant management  Dr Loyd will be present at the birth   Birth in the OR for baby to be close to NICU  Continue Pitocin augmentation      Wendy D Gerhardt, RODNEY  Obstetrics  [facility]

## 2020-07-09 NOTE — PROGRESS NOTES
"LABOR NOTE    S:  Pt resting in bed, reports feeling tired and crampy, no additional complaints.  Spouse at bedside and supportive.     O: BP 99/60   Pulse 70   Temp 97.2 °F (36.2 °C) (Temporal)   Resp 16   Ht 5' 9" (1.753 m)   Wt 87.3 kg (192 lb 7.4 oz)   LMP 2019 (Within Weeks)   SpO2 98%   Breastfeeding No   BMI 28.42 kg/m²     GENERAL: Calm and appropriate affect  NEURO: Alert, oriented, normal speech  ABDOMEN: Nontender, Fundus palpates soft between UC's.  FHT: Baseline 130, moderate BTBV, positive accels, no decels. Cat 1, reassuring.  CTX: Irregular  SVE: deferred - gentle tension applied to quijano balloon - still in place      ASSESSMENT:   39 y.o.  IUP at 37w6d, FHT reassuring/ Cat 1    Patient Active Problem List   Diagnosis    Alcohol use affecting pregnanccy--possible heavy use in early pregnancy: NO CURRENT use    Pregnancy    Anxiety    Panic attack    Birth Center Risk Assessment: 1-management on labor and Delivery due to fetal cardiac anomaly    Cystic thyroid nodule    Low-lying placenta in first trimester    Fetal cardiac anomaly complicating pregnancy, antepartum, single gestation    Indication for care in labor or delivery    Poor fetal growth affecting management of mother in third trimester         PLAN:  Offered Benadryl so help pt rest - pt desires to take her home med Vistaril 50mg - may do so now x1  Continue close Maternal/Fetal monitoring  Pitocin Augmentation per protocol - currently infusing at 4mu  Recheck 4 hours or PRN        Nichelle Gonzalez CNM    "

## 2020-07-09 NOTE — SUBJECTIVE & OBJECTIVE
Interval History:  Sonal is a 39 y.o.  at 37w6d. She is doing well. She reports that quijano bulb came out @ ~0800. Only having mild contratcions/crramps.    Objective:     Vital Signs (Most Recent):  Temp: 98.2 °F (36.8 °C) (20 0457)  Pulse: (!) 57 (20 0730)  Resp: 16 (20 1911)  BP: (!) 107/57 (20 0730)  SpO2: 97 % (20 0700) Vital Signs (24h Range):  Temp:  [97.2 °F (36.2 °C)-98.2 °F (36.8 °C)] 98.2 °F (36.8 °C)  Pulse:  [57-93] 57  Resp:  [16] 16  SpO2:  [95 %-100 %] 97 %  BP: ()/(53-77) 107/57     Weight: 87.3 kg (192 lb 7.4 oz)  Body mass index is 28.42 kg/m².    FHT: 130's Cat 1 (reassuring), Great BTBV and accels  TOCO:  Q 4 minutes, mild    Cervical Exam:  Dilation:  4  Effacement:  50%  Station: -1  Presentation: Vertex     Significant Labs:  Lab Results   Component Value Date    GROUPTRH O POS 2020    HEPBSAG Negative 2020    STREPBCULT No Group B Streptococcus isolated 2020       I have personallly reviewed all pertinent lab results from the last 24 hours.  Recent Lab Results       20  2102   20  1950   20  1753        Baso # 0.04         Basophil% 0.4         Differential Method Automated         Eos # 0.2         Eosinophil% 1.7         Gran # (ANC) 6.7         Gran% 69.9         Group & Rh   O POS       Hematocrit 33.2         Hemoglobin 11.0         Immature Grans (Abs) 0.08  Comment:  Mild elevation in immature granulocytes is non specific and   can be seen in a variety of conditions including stress response,   acute inflammation, trauma and pregnancy. Correlation with other   laboratory and clinical findings is essential.           Immature Granulocytes 0.8         INDIRECT MALINI   NEG       Lymph # 1.8         Lymph% 19.1         MCH 29.9         MCHC 33.1         MCV 90         Mono # 0.8         Mono% 8.1         MPV 9.6         nRBC 0         Platelets 215         RBC 3.68         RDW 12.7         SARS-CoV-2 RNA,  Amplification, Qual     Negative  Comment:  This test utilizes isothermal nucleic acid amplification   technology to detect the SARS-CoV-2 RdRp nucleic acid segment.   The analytical sensitivity (limit of detection) is 125 genome   equivalents/mL.   A POSITIVE result implies infection with the SARS-CoV-2 virus;  the patient is presumed to be contagious.    A NEGATIVE result means that SARS-CoV-2 nucleic acids are not  present above the limit of detection. A NEGATIVE result should be   treated as presumptive. It does not rule out the possibility of   COVID-19 and should not be the sole basis for treatment decisions.   If COVID-19 is strongly suspected based on clinical and exposure   history, re-testing using an alternate molecular assay should be   considered.   This test is only for use under the Food and Drug   Administration s Emergency Use Authorization (EUA).   Commercial kits are provided by [employer].   Performance characteristics of the EUA have been independently  verified by [facility] Department of  Pathology and Laboratory Medicine.   _________________________________________________________________  The ID NOW COVID-19 Letter of Authorization, along with the   authorized Fact Sheet for Healthcare Providers, the authorized Fact  Sheet for Patients, and authorized labeling are available on the FDA   website:  www.fda.gov/MedicalDevices/Safety/EmergencySituations/xki184404.htm       WBC 9.58

## 2020-07-09 NOTE — ASSESSMENT & PLAN NOTE
Fetus with likely Tetralogy of Fallot  - CEFM  - NICU in attendance at delivery  - Plan for delivery in OR

## 2020-07-09 NOTE — NURSING
Sherman balloon out at 0750 while patient on toilet. Bloody mucous discharge noted. Pt reports irregular mild cramping. Endorses +BALAJI. W. Gerhardt, CNM updated on patient status.

## 2020-07-09 NOTE — ASSESSMENT & PLAN NOTE
- Admit to L&D  - Consents signed  - Cephalic per Leopold's and VE  - Obtain IV access  - Type & Screen and CBC  - CEFM  - Extensive discussion regarding plan of care, all questions answered  - Plan delivery in OR with NICU in attendance  - Plan quijano balloon IOL (inserted with pt's consent), followed by low dose Pitocin for cervical ripening.

## 2020-07-10 PROBLEM — Z98.891 S/P CESAREAN SECTION: Status: ACTIVE | Noted: 2020-07-10

## 2020-07-10 LAB
ALLENS TEST: ABNORMAL
ALLENS TEST: ABNORMAL
DELSYS: ABNORMAL
DELSYS: ABNORMAL
HCO3 UR-SCNC: 20.3 MMOL/L (ref 24–28)
HCO3 UR-SCNC: 22.5 MMOL/L (ref 24–28)
MODE: ABNORMAL
MODE: ABNORMAL
PCO2 BLDA: 40.9 MMHG (ref 35–45)
PCO2 BLDA: 53 MMHG (ref 35–45)
PH SMN: 7.24 [PH] (ref 7.35–7.45)
PH SMN: 7.3 [PH] (ref 7.35–7.45)
PO2 BLDA: 14 MMHG (ref 80–100)
PO2 BLDA: 26 MMHG (ref 80–100)
POC BE: -5 MMOL/L
POC BE: -6 MMOL/L
POC SATURATED O2: 12 % (ref 95–100)
POC SATURATED O2: 43 % (ref 95–100)
SAMPLE: ABNORMAL
SAMPLE: ABNORMAL
SITE: ABNORMAL
SITE: ABNORMAL

## 2020-07-10 PROCEDURE — 11000001 HC ACUTE MED/SURG PRIVATE ROOM

## 2020-07-10 PROCEDURE — 27200710 HC EPIDURAL INFUSION PUMP SET: Performed by: ANESTHESIOLOGY

## 2020-07-10 PROCEDURE — 63600175 PHARM REV CODE 636 W HCPCS: Performed by: STUDENT IN AN ORGANIZED HEALTH CARE EDUCATION/TRAINING PROGRAM

## 2020-07-10 PROCEDURE — 59514 PR CESAREAN DELIVERY ONLY: ICD-10-PCS | Mod: AT,,, | Performed by: OBSTETRICS & GYNECOLOGY

## 2020-07-10 PROCEDURE — 25000003 PHARM REV CODE 250: Performed by: ANESTHESIOLOGY

## 2020-07-10 PROCEDURE — 01968 PR INSERT CATH,ART,PERCUT,SHORTTERM: ICD-10-PCS | Mod: AA,,, | Performed by: ANESTHESIOLOGY

## 2020-07-10 PROCEDURE — 88307 PR  SURG PATH,LEVEL V: ICD-10-PCS | Mod: 26,,, | Performed by: PATHOLOGY

## 2020-07-10 PROCEDURE — 63600175 PHARM REV CODE 636 W HCPCS: Performed by: ADVANCED PRACTICE MIDWIFE

## 2020-07-10 PROCEDURE — 37000009 HC ANESTHESIA EA ADD 15 MINS: Performed by: OBSTETRICS & GYNECOLOGY

## 2020-07-10 PROCEDURE — 59514 CESAREAN DELIVERY ONLY: CPT | Mod: AA,,, | Performed by: ANESTHESIOLOGY

## 2020-07-10 PROCEDURE — 63600175 PHARM REV CODE 636 W HCPCS: Performed by: ANESTHESIOLOGY

## 2020-07-10 PROCEDURE — 59514 PRA REAN DELIVERY ONLY: ICD-10-PCS | Mod: AA,,, | Performed by: ANESTHESIOLOGY

## 2020-07-10 PROCEDURE — 25000003 PHARM REV CODE 250: Performed by: STUDENT IN AN ORGANIZED HEALTH CARE EDUCATION/TRAINING PROGRAM

## 2020-07-10 PROCEDURE — 71000033 HC RECOVERY, INTIAL HOUR: Performed by: OBSTETRICS & GYNECOLOGY

## 2020-07-10 PROCEDURE — 37000008 HC ANESTHESIA 1ST 15 MINUTES: Performed by: OBSTETRICS & GYNECOLOGY

## 2020-07-10 PROCEDURE — 72100002 HC LABOR CARE, 1ST 8 HOURS

## 2020-07-10 PROCEDURE — 71000039 HC RECOVERY, EACH ADD'L HOUR: Performed by: OBSTETRICS & GYNECOLOGY

## 2020-07-10 PROCEDURE — 72100003 HC LABOR CARE, EA. ADDL. 8 HRS

## 2020-07-10 PROCEDURE — 51702 INSERT TEMP BLADDER CATH: CPT

## 2020-07-10 PROCEDURE — 88307 TISSUE EXAM BY PATHOLOGIST: CPT | Performed by: PATHOLOGY

## 2020-07-10 PROCEDURE — C1751 CATH, INF, PER/CENT/MIDLINE: HCPCS | Performed by: ANESTHESIOLOGY

## 2020-07-10 PROCEDURE — 82803 BLOOD GASES ANY COMBINATION: CPT

## 2020-07-10 PROCEDURE — 88307 TISSUE EXAM BY PATHOLOGIST: CPT | Mod: 26,,, | Performed by: PATHOLOGY

## 2020-07-10 PROCEDURE — 62326 NJX INTERLAMINAR LMBR/SAC: CPT | Performed by: STUDENT IN AN ORGANIZED HEALTH CARE EDUCATION/TRAINING PROGRAM

## 2020-07-10 PROCEDURE — 99900059 HC C-SECTION ATTEND (STAT)

## 2020-07-10 PROCEDURE — 36416 COLLJ CAPILLARY BLOOD SPEC: CPT

## 2020-07-10 PROCEDURE — 36004725 HC OB OR TIME LEV III - EA ADD 15 MIN: Performed by: OBSTETRICS & GYNECOLOGY

## 2020-07-10 PROCEDURE — 99900035 HC TECH TIME PER 15 MIN (STAT)

## 2020-07-10 PROCEDURE — 36004724 HC OB OR TIME LEV III - 1ST 15 MIN: Performed by: OBSTETRICS & GYNECOLOGY

## 2020-07-10 PROCEDURE — 59514 CESAREAN DELIVERY ONLY: CPT | Mod: AT,,, | Performed by: OBSTETRICS & GYNECOLOGY

## 2020-07-10 PROCEDURE — 01968 ANES/ANALG CS DLVR NEURAXIAL: CPT | Mod: AA,,, | Performed by: ANESTHESIOLOGY

## 2020-07-10 RX ORDER — PHENYLEPHRINE HYDROCHLORIDE 10 MG/ML
INJECTION INTRAVENOUS
Status: DISCONTINUED | OUTPATIENT
Start: 2020-07-10 | End: 2020-07-10

## 2020-07-10 RX ORDER — EPHEDRINE SULFATE 50 MG/ML
INJECTION, SOLUTION INTRAVENOUS
Status: DISCONTINUED | OUTPATIENT
Start: 2020-07-10 | End: 2020-07-10

## 2020-07-10 RX ORDER — HYDROCORTISONE 25 MG/G
CREAM TOPICAL 3 TIMES DAILY PRN
Status: DISCONTINUED | OUTPATIENT
Start: 2020-07-10 | End: 2020-07-13 | Stop reason: HOSPADM

## 2020-07-10 RX ORDER — BUPIVACAINE HYDROCHLORIDE 2.5 MG/ML
INJECTION, SOLUTION EPIDURAL; INFILTRATION; INTRACAUDAL
Status: DISPENSED
Start: 2020-07-10 | End: 2020-07-11

## 2020-07-10 RX ORDER — ONDANSETRON 2 MG/ML
4 INJECTION INTRAMUSCULAR; INTRAVENOUS EVERY 6 HOURS PRN
Status: ACTIVE | OUTPATIENT
Start: 2020-07-10 | End: 2020-07-11

## 2020-07-10 RX ORDER — NALOXONE HCL 0.4 MG/ML
0.04 VIAL (ML) INJECTION
Status: DISCONTINUED | OUTPATIENT
Start: 2020-07-10 | End: 2020-07-12

## 2020-07-10 RX ORDER — FENTANYL/BUPIVACAINE/NS/PF 2MCG/ML-.1
PLASTIC BAG, INJECTION (ML) INJECTION CONTINUOUS
Status: DISCONTINUED | OUTPATIENT
Start: 2020-07-10 | End: 2020-07-11

## 2020-07-10 RX ORDER — FENTANYL CITRATE 50 UG/ML
INJECTION, SOLUTION INTRAMUSCULAR; INTRAVENOUS
Status: COMPLETED
Start: 2020-07-10 | End: 2020-07-10

## 2020-07-10 RX ORDER — SODIUM CHLORIDE 0.9 % (FLUSH) 0.9 %
10 SYRINGE (ML) INJECTION
Status: DISCONTINUED | OUTPATIENT
Start: 2020-07-10 | End: 2020-07-13 | Stop reason: HOSPADM

## 2020-07-10 RX ORDER — SODIUM CHLORIDE, SODIUM LACTATE, POTASSIUM CHLORIDE, CALCIUM CHLORIDE 600; 310; 30; 20 MG/100ML; MG/100ML; MG/100ML; MG/100ML
INJECTION, SOLUTION INTRAVENOUS CONTINUOUS
Status: DISCONTINUED | OUTPATIENT
Start: 2020-07-10 | End: 2020-07-11

## 2020-07-10 RX ORDER — PRENATAL WITH FERROUS FUM AND FOLIC ACID 3080; 920; 120; 400; 22; 1.84; 3; 20; 10; 1; 12; 200; 27; 25; 2 [IU]/1; [IU]/1; MG/1; [IU]/1; MG/1; MG/1; MG/1; MG/1; MG/1; MG/1; UG/1; MG/1; MG/1; MG/1; MG/1
1 TABLET ORAL DAILY
Status: DISCONTINUED | OUTPATIENT
Start: 2020-07-11 | End: 2020-07-13 | Stop reason: HOSPADM

## 2020-07-10 RX ORDER — FENTANYL/BUPIVACAINE/NS/PF 2MCG/ML-.1
PLASTIC BAG, INJECTION (ML) INJECTION
Status: COMPLETED
Start: 2020-07-10 | End: 2020-07-10

## 2020-07-10 RX ORDER — DOCUSATE SODIUM 100 MG/1
200 CAPSULE, LIQUID FILLED ORAL 2 TIMES DAILY
Status: DISCONTINUED | OUTPATIENT
Start: 2020-07-10 | End: 2020-07-13 | Stop reason: HOSPADM

## 2020-07-10 RX ORDER — SODIUM CITRATE AND CITRIC ACID MONOHYDRATE 334; 500 MG/5ML; MG/5ML
30 SOLUTION ORAL ONCE
Status: DISCONTINUED | OUTPATIENT
Start: 2020-07-10 | End: 2020-07-11

## 2020-07-10 RX ORDER — DEXAMETHASONE SODIUM PHOSPHATE 4 MG/ML
INJECTION, SOLUTION INTRA-ARTICULAR; INTRALESIONAL; INTRAMUSCULAR; INTRAVENOUS; SOFT TISSUE
Status: DISCONTINUED | OUTPATIENT
Start: 2020-07-10 | End: 2020-07-10

## 2020-07-10 RX ORDER — ACETAMINOPHEN 325 MG/1
650 TABLET ORAL
Status: COMPLETED | OUTPATIENT
Start: 2020-07-10 | End: 2020-07-11

## 2020-07-10 RX ORDER — OXYCODONE HYDROCHLORIDE 5 MG/1
5 TABLET ORAL EVERY 4 HOURS PRN
Status: ACTIVE | OUTPATIENT
Start: 2020-07-10 | End: 2020-07-11

## 2020-07-10 RX ORDER — SODIUM CHLORIDE, SODIUM LACTATE, POTASSIUM CHLORIDE, CALCIUM CHLORIDE 600; 310; 30; 20 MG/100ML; MG/100ML; MG/100ML; MG/100ML
INJECTION, SOLUTION INTRAVENOUS CONTINUOUS PRN
Status: DISCONTINUED | OUTPATIENT
Start: 2020-07-10 | End: 2020-07-10

## 2020-07-10 RX ORDER — FENTANYL/BUPIVACAINE/NS/PF 2MCG/ML-.1
PLASTIC BAG, INJECTION (ML) INJECTION
Status: DISPENSED
Start: 2020-07-10 | End: 2020-07-11

## 2020-07-10 RX ORDER — DIPHENHYDRAMINE HCL 25 MG
25 CAPSULE ORAL EVERY 4 HOURS PRN
Status: DISCONTINUED | OUTPATIENT
Start: 2020-07-10 | End: 2020-07-13 | Stop reason: HOSPADM

## 2020-07-10 RX ORDER — ONDANSETRON 2 MG/ML
4 INJECTION INTRAMUSCULAR; INTRAVENOUS EVERY 12 HOURS PRN
Status: DISCONTINUED | OUTPATIENT
Start: 2020-07-10 | End: 2020-07-10

## 2020-07-10 RX ORDER — FAMOTIDINE 10 MG/ML
INJECTION INTRAVENOUS
Status: DISPENSED
Start: 2020-07-10 | End: 2020-07-10

## 2020-07-10 RX ORDER — AMOXICILLIN 250 MG
1 CAPSULE ORAL NIGHTLY PRN
Status: DISCONTINUED | OUTPATIENT
Start: 2020-07-10 | End: 2020-07-13 | Stop reason: HOSPADM

## 2020-07-10 RX ORDER — OXYCODONE AND ACETAMINOPHEN 5; 325 MG/1; MG/1
1 TABLET ORAL EVERY 4 HOURS PRN
Status: DISCONTINUED | OUTPATIENT
Start: 2020-07-10 | End: 2020-07-13 | Stop reason: HOSPADM

## 2020-07-10 RX ORDER — ADHESIVE BANDAGE
30 BANDAGE TOPICAL 2 TIMES DAILY PRN
Status: DISCONTINUED | OUTPATIENT
Start: 2020-07-11 | End: 2020-07-13 | Stop reason: HOSPADM

## 2020-07-10 RX ORDER — OXYTOCIN 10 [USP'U]/ML
INJECTION, SOLUTION INTRAMUSCULAR; INTRAVENOUS
Status: DISCONTINUED | OUTPATIENT
Start: 2020-07-10 | End: 2020-07-10

## 2020-07-10 RX ORDER — BUPIVACAINE HYDROCHLORIDE 2.5 MG/ML
INJECTION, SOLUTION EPIDURAL; INFILTRATION; INTRACAUDAL
Status: DISPENSED
Start: 2020-07-10 | End: 2020-07-10

## 2020-07-10 RX ORDER — FENTANYL CITRATE 50 UG/ML
INJECTION, SOLUTION INTRAMUSCULAR; INTRAVENOUS
Status: DISPENSED
Start: 2020-07-10 | End: 2020-07-11

## 2020-07-10 RX ORDER — OXYCODONE HYDROCHLORIDE 5 MG/1
10 TABLET ORAL EVERY 4 HOURS PRN
Status: DISPENSED | OUTPATIENT
Start: 2020-07-10 | End: 2020-07-11

## 2020-07-10 RX ORDER — KETOROLAC TROMETHAMINE 30 MG/ML
30 INJECTION, SOLUTION INTRAMUSCULAR; INTRAVENOUS EVERY 6 HOURS
Status: COMPLETED | OUTPATIENT
Start: 2020-07-10 | End: 2020-07-10

## 2020-07-10 RX ORDER — MUPIROCIN 20 MG/G
OINTMENT TOPICAL 2 TIMES DAILY
Status: DISCONTINUED | OUTPATIENT
Start: 2020-07-10 | End: 2020-07-13 | Stop reason: HOSPADM

## 2020-07-10 RX ORDER — ACETAMINOPHEN 10 MG/ML
INJECTION, SOLUTION INTRAVENOUS
Status: DISCONTINUED | OUTPATIENT
Start: 2020-07-10 | End: 2020-07-10

## 2020-07-10 RX ORDER — FAMOTIDINE 10 MG/ML
20 INJECTION INTRAVENOUS ONCE
Status: DISCONTINUED | OUTPATIENT
Start: 2020-07-10 | End: 2020-07-13 | Stop reason: HOSPADM

## 2020-07-10 RX ORDER — BISACODYL 10 MG
10 SUPPOSITORY, RECTAL RECTAL ONCE AS NEEDED
Status: DISCONTINUED | OUTPATIENT
Start: 2020-07-10 | End: 2020-07-13 | Stop reason: HOSPADM

## 2020-07-10 RX ORDER — CHLOROPROCAINE HYDROCHLORIDE 30 MG/ML
INJECTION, SOLUTION EPIDURAL; INFILTRATION; INTRACAUDAL; PERINEURAL
Status: DISPENSED
Start: 2020-07-10 | End: 2020-07-10

## 2020-07-10 RX ORDER — OXYCODONE AND ACETAMINOPHEN 10; 325 MG/1; MG/1
1 TABLET ORAL EVERY 4 HOURS PRN
Status: DISCONTINUED | OUTPATIENT
Start: 2020-07-10 | End: 2020-07-13 | Stop reason: HOSPADM

## 2020-07-10 RX ORDER — MORPHINE SULFATE 0.5 MG/ML
INJECTION, SOLUTION EPIDURAL; INTRATHECAL; INTRAVENOUS
Status: DISCONTINUED | OUTPATIENT
Start: 2020-07-10 | End: 2020-07-10

## 2020-07-10 RX ORDER — ONDANSETRON 2 MG/ML
INJECTION INTRAMUSCULAR; INTRAVENOUS
Status: DISCONTINUED | OUTPATIENT
Start: 2020-07-10 | End: 2020-07-10

## 2020-07-10 RX ORDER — PROMETHAZINE HYDROCHLORIDE 25 MG/ML
INJECTION, SOLUTION INTRAMUSCULAR; INTRAVENOUS
Status: DISCONTINUED | OUTPATIENT
Start: 2020-07-10 | End: 2020-07-10

## 2020-07-10 RX ORDER — FENTANYL CITRATE 50 UG/ML
INJECTION, SOLUTION INTRAMUSCULAR; INTRAVENOUS
Status: DISCONTINUED | OUTPATIENT
Start: 2020-07-10 | End: 2020-07-10

## 2020-07-10 RX ORDER — AZITHROMYCIN 100 MG/ML
INJECTION, POWDER, LYOPHILIZED, FOR SOLUTION INTRAVENOUS
Status: DISCONTINUED | OUTPATIENT
Start: 2020-07-10 | End: 2020-07-10

## 2020-07-10 RX ORDER — CEFAZOLIN SODIUM 1 G/3ML
INJECTION, POWDER, FOR SOLUTION INTRAMUSCULAR; INTRAVENOUS
Status: DISCONTINUED | OUTPATIENT
Start: 2020-07-10 | End: 2020-07-10

## 2020-07-10 RX ORDER — IBUPROFEN 600 MG/1
600 TABLET ORAL EVERY 6 HOURS
Status: DISCONTINUED | OUTPATIENT
Start: 2020-07-11 | End: 2020-07-11

## 2020-07-10 RX ORDER — ONDANSETRON 8 MG/1
8 TABLET, ORALLY DISINTEGRATING ORAL EVERY 8 HOURS PRN
Status: DISCONTINUED | OUTPATIENT
Start: 2020-07-10 | End: 2020-07-13 | Stop reason: HOSPADM

## 2020-07-10 RX ORDER — FENTANYL/BUPIVACAINE/NS/PF 2MCG/ML-.1
PLASTIC BAG, INJECTION (ML) INJECTION CONTINUOUS PRN
Status: DISCONTINUED | OUTPATIENT
Start: 2020-07-10 | End: 2020-07-10

## 2020-07-10 RX ORDER — SODIUM CITRATE AND CITRIC ACID MONOHYDRATE 334; 500 MG/5ML; MG/5ML
SOLUTION ORAL
Status: DISPENSED
Start: 2020-07-10 | End: 2020-07-10

## 2020-07-10 RX ORDER — SIMETHICONE 80 MG
1 TABLET,CHEWABLE ORAL EVERY 6 HOURS PRN
Status: DISCONTINUED | OUTPATIENT
Start: 2020-07-10 | End: 2020-07-13 | Stop reason: HOSPADM

## 2020-07-10 RX ORDER — OXYTOCIN/RINGER'S LACTATE 30/500 ML
95 PLASTIC BAG, INJECTION (ML) INTRAVENOUS ONCE
Status: DISCONTINUED | OUTPATIENT
Start: 2020-07-10 | End: 2020-07-11

## 2020-07-10 RX ORDER — CHLOROPROCAINE HYDROCHLORIDE 20 MG/ML
INJECTION, SOLUTION EPIDURAL; INFILTRATION; INTRACAUDAL; PERINEURAL
Status: DISCONTINUED | OUTPATIENT
Start: 2020-07-10 | End: 2020-07-10

## 2020-07-10 RX ADMIN — FENTANYL CITRATE 100 MCG: 50 INJECTION, SOLUTION INTRAMUSCULAR; INTRAVENOUS at 10:07

## 2020-07-10 RX ADMIN — DEXAMETHASONE SODIUM PHOSPHATE 4 MG: 4 INJECTION, SOLUTION INTRAMUSCULAR; INTRAVENOUS at 10:07

## 2020-07-10 RX ADMIN — Medication 1.5 MG: at 10:07

## 2020-07-10 RX ADMIN — EPHEDRINE SULFATE 5 MG: 50 INJECTION INTRAVENOUS at 11:07

## 2020-07-10 RX ADMIN — ACETAMINOPHEN 650 MG: 325 TABLET ORAL at 10:07

## 2020-07-10 RX ADMIN — OXYTOCIN 3 UNITS: 10 INJECTION, SOLUTION INTRAMUSCULAR; INTRAVENOUS at 10:07

## 2020-07-10 RX ADMIN — PROMETHAZINE HYDROCHLORIDE 6.25 MG: 25 INJECTION INTRAMUSCULAR; INTRAVENOUS at 11:07

## 2020-07-10 RX ADMIN — KETOROLAC TROMETHAMINE 30 MG: 30 INJECTION, SOLUTION INTRAMUSCULAR at 11:07

## 2020-07-10 RX ADMIN — OXYCODONE 10 MG: 5 TABLET ORAL at 10:07

## 2020-07-10 RX ADMIN — DOCUSATE SODIUM 200 MG: 100 CAPSULE, LIQUID FILLED ORAL at 11:07

## 2020-07-10 RX ADMIN — SODIUM CHLORIDE, SODIUM LACTATE, POTASSIUM CHLORIDE, AND CALCIUM CHLORIDE: .6; .31; .03; .02 INJECTION, SOLUTION INTRAVENOUS at 10:07

## 2020-07-10 RX ADMIN — CEFAZOLIN 2 G: 330 INJECTION, POWDER, FOR SOLUTION INTRAMUSCULAR; INTRAVENOUS at 10:07

## 2020-07-10 RX ADMIN — OXYTOCIN 5 UNITS: 10 INJECTION, SOLUTION INTRAMUSCULAR; INTRAVENOUS at 10:07

## 2020-07-10 RX ADMIN — PHENYLEPHRINE HYDROCHLORIDE 100 MCG: 10 INJECTION INTRAVENOUS at 11:07

## 2020-07-10 RX ADMIN — ONDANSETRON 4 MG: 2 INJECTION INTRAMUSCULAR; INTRAVENOUS at 10:07

## 2020-07-10 RX ADMIN — PHENYLEPHRINE HYDROCHLORIDE 100 MCG: 10 INJECTION INTRAVENOUS at 10:07

## 2020-07-10 RX ADMIN — FENTANYL CITRATE 100 MCG: 50 INJECTION, SOLUTION INTRAMUSCULAR; INTRAVENOUS at 09:07

## 2020-07-10 RX ADMIN — CHLOROPROCAINE HYDROCHLORIDE 5 ML: 20 INJECTION, SOLUTION EPIDURAL; INFILTRATION; INTRACAUDAL; PERINEURAL at 10:07

## 2020-07-10 RX ADMIN — ACETAMINOPHEN 650 MG: 325 TABLET ORAL at 05:07

## 2020-07-10 RX ADMIN — KETOROLAC TROMETHAMINE 30 MG: 30 INJECTION, SOLUTION INTRAMUSCULAR at 05:07

## 2020-07-10 RX ADMIN — OXYTOCIN 3 UNITS: 10 INJECTION, SOLUTION INTRAMUSCULAR; INTRAVENOUS at 11:07

## 2020-07-10 RX ADMIN — AZITHROMYCIN MONOHYDRATE 500 MG: 500 INJECTION, POWDER, LYOPHILIZED, FOR SOLUTION INTRAVENOUS at 10:07

## 2020-07-10 RX ADMIN — PHENYLEPHRINE HYDROCHLORIDE 200 MCG: 10 INJECTION INTRAVENOUS at 10:07

## 2020-07-10 RX ADMIN — SODIUM CHLORIDE, SODIUM LACTATE, POTASSIUM CHLORIDE, AND CALCIUM CHLORIDE: 600; 310; 30; 20 INJECTION, SOLUTION INTRAVENOUS at 06:07

## 2020-07-10 RX ADMIN — SIMETHICONE CHEW TAB 80 MG 80 MG: 80 TABLET ORAL at 10:07

## 2020-07-10 RX ADMIN — Medication 10 ML: at 09:07

## 2020-07-10 RX ADMIN — CHLOROPROCAINE HYDROCHLORIDE 15 ML: 20 INJECTION, SOLUTION EPIDURAL; INFILTRATION; INTRACAUDAL; PERINEURAL at 10:07

## 2020-07-10 RX ADMIN — SODIUM CHLORIDE, SODIUM LACTATE, POTASSIUM CHLORIDE, AND CALCIUM CHLORIDE 1000 ML: .6; .31; .03; .02 INJECTION, SOLUTION INTRAVENOUS at 11:07

## 2020-07-10 RX ADMIN — Medication 10 ML/HR: at 09:07

## 2020-07-10 RX ADMIN — PHENYLEPHRINE HYDROCHLORIDE 50 MCG: 10 INJECTION INTRAVENOUS at 10:07

## 2020-07-10 RX ADMIN — MUPIROCIN: 20 OINTMENT TOPICAL at 11:07

## 2020-07-10 RX ADMIN — SODIUM CHLORIDE, SODIUM LACTATE, POTASSIUM CHLORIDE, AND CALCIUM CHLORIDE: 600; 310; 30; 20 INJECTION, SOLUTION INTRAVENOUS at 10:07

## 2020-07-10 RX ADMIN — OXYCODONE 10 MG: 5 TABLET ORAL at 02:07

## 2020-07-10 RX ADMIN — PHENYLEPHRINE HYDROCHLORIDE 200 MCG: 10 INJECTION INTRAVENOUS at 11:07

## 2020-07-10 RX ADMIN — ACETAMINOPHEN 1000 MG: 10 INJECTION, SOLUTION INTRAVENOUS at 10:07

## 2020-07-10 NOTE — ANESTHESIA PROCEDURE NOTES
Epidural    Patient location during procedure: OB   Reason for block: primary anesthetic   Diagnosis: iup in labor   Start time: 7/10/2020 9:10 AM  Timeout: 7/10/2020 9:08 AM  End time: 7/10/2020 9:30 AM  Surgery related to: Vaginal Delivery    Staffing  Performing Provider: Reed Colvin MD  Authorizing Provider: Consuelo Aden MD        Preanesthetic Checklist  Completed: patient identified, site marked, surgical consent, pre-op evaluation, timeout performed, IV checked, risks and benefits discussed, monitors and equipment checked, anesthesia consent given, hand hygiene performed and patient being monitored  Preparation  Patient position: sitting  Prep: ChloraPrep  Patient monitoring: Pulse Ox  Epidural  Skin Anesthetic: lidocaine 1%  Skin Wheal: 3 mL  Administration type: continuous  Approach: midline  Interspace: L3-4    Injection technique: DIANE saline  Needle and Epidural Catheter  Needle type: Tuohy   Needle gauge: 17  Needle length: 3.5 inches  Needle insertion depth: 5.5 cm  Catheter type: springwound  Catheter size: 19 G  Catheter at skin depth: 10.5 cm  Test dose: 3 mL of lidocaine 1.5% with Epi 1-to-200,000  Additional Documentation: incremental injection, negative aspiration for heme and CSF, no paresthesia on injection, no signs/symptoms of IV or SA injection, no significant pain on injection and no significant complaints from patient  Needle localization: anatomical landmarks  Medications:  Volume per aspiration: 5 mL  Time between aspirations: 5 minutes  Assessment  Ease of block: easy  Patient's tolerance of the procedure: comfortable throughout block and no complaintsNo inadvertent dural puncture with Tuohy.  Dural puncture performed with spinal needle.

## 2020-07-10 NOTE — PROGRESS NOTES
S/w Yainra, NICU CN.  Pt had questions about visiting guidelines.  Per Yanira, both parents are allowed to visit NICU at the same time but no one else.  Also patients are not allowed to come in and out during shift change.  Relayed to patient.

## 2020-07-10 NOTE — L&D DELIVERY NOTE
Section Procedure Note    Date of procedure: 2020    Indications: 39 y.o.  female with IUP 38w0d weeks' gestational age for primary low transverse  section. Following artificial rupture of membranes, fetal heart monitoring showed fetal bradycardia to 70-80s without improvement despite normal resuscitative measures. Sterile cervical exam revealed higinio blood that was concerning for placental abruption. Considering fetal cardiac anomalies and remote from delivery, decision was made to move to OR in light of the clinical scenario.     Pre-operative Diagnosis: Fetal Intolerance of Labor     Post-operative Diagnosis: same    Procedure: Primary low transverse  section    Surgeon:   Surgeon(s) and Role:   Rosalba Loyd MD - Primary     Assistants:   MANOHAR Mcarthur MD (PGY-1)  Charis Jordan MD (PGY-3)      Anesthesia: Epidural anesthesia    Findings: Live infant. Normal appearing uterus and bilateral fallopian tubes/ovaries    Estimated Blood Loss: Calculated QBL (Quantitative Blood Loss) (mL): 1205     UOP: No intake/output data recorded. ml    Specimens:    1. Placenta                  Complications:  None; patient tolerated the procedure well.           Disposition: Recovery           Condition: stable    Procedure Details   The patient was seen in her labor room and indications for CS, fetal intolerance of labor, discussed with patient.. The risks, benefits, complications, treatment options, and expected outcomes were discussed with the patient. After moving pt to OR, fetal heart tones were confirmed to be within normal limits, and pt was prepped and draped in the normal fashion. The patient concurred with the proposed plan, giving informed consent. The patient was taken to Operating Room, identified as Sonal Simms, birthdate and  procedure were verified.  A Time Out was held and the above information confirmed.    After anesthesia epidural was found to be adequate, the  patient was prepped and draped in the usual sterile fashion in the dorsal supine position with a leftward tilt. A pfannenstiel incision was made in the skin and carried  through the underlying subcutaneous tissue to the level of the  fascia. The fascia was scored at the midline. The fascial incision was then extended transversely using the curved zayas scissors. The superior aspect of the fascia was grasped with [facility] clamps x 2 and  from the underlying rectus tissue superiorly and with the help of the curved zayas scissors. Attention was then turned to the inferior aspect of the fascial incision which was grasped and  from the underlying rectus muscle in a similar fashion.The peritoneum was identified, found to be free of adherent bowel and entered bluntly. The peritoneal incision was extended with controlled lateral traction. The vesico-uterine peritoneum was then identified and bladder blade was inserted. The vesico-uterine peritoneum was grasped, tented away from the underlying uterus and entered sharply with the Metzenbaum scissors. The incision was extended  transversely and the bladder flap was bluntly freed from the lower uterine segment. The bladder blade was reinserted to keep the bladder out of the operative field. A transverse elliptical uterine incision was made with knife and extended with controlled cephalad-caudal traction. The amniotic sac was then entered with the final stroke of the scalpel. At this point a small 1 cm incidental incision was made on the fetal skin. Infant was noted to be in cephalic (OP) position, was delivered, and then handed off to the waiting staff.  The umbilical cord was clamped and cut cord blood was obtained for evaluation.     The placenta was removed intact and appeared and sent to path as a portion of it was concerning for abruption. The uterus was exteriorized. The uterus, tubes and ovaries were examined and appeared normal. The uterine incision was  closed with running locked sutures of 0 chromic gut. Hemostasis was observed. The uterus was returned to the abdominal cavity. Incision was reinspected and good hemostasis was noted. The muscle was reapproximated with 2-0 Vicryl. The fascia was then reapproximated with running sutures of 1 vicryl. The subcutaneous layer was reapproximated with 2-0 Vicryl. The skin was reapproximated with 4-0 Monocryl. Sponge, lap and needle counts were correct x 2 prior the abdominal closure and at the conclusion of the case.     Delivery Information for Kath Simms    Birth information:  YOB: 2020   Time of birth: 10:30 AM   Sex: female   Head Delivery Date/Time: 7/10/2020 10:30 AM   Delivery type: , Low Transverse   Gestational Age: 38w0d    Delivery Providers    Delivering clinician: Rosalba Loyd MD   Provider Role    MD Luigi Amador MD Brooke A. Manno, RN Alison Hammer, KEI Limon, KEI Fleming             Measurements    Weight:   Length:          Apgars    Living status: Living  Apgars:  1 min.:  5 min.:  10 min.:  15 min.:  20 min.:    Skin color:  1  1       Heart rate:  2  2       Reflex irritability:  2  2       Muscle tone:  2  2       Respiratory effort:  2  2       Total:  9  9              Operative Delivery    Forceps attempted?: No  Vacuum extractor attempted?: No         Shoulder Dystocia    Shoulder dystocia present?: No           Presentation    Presentation: Vertex  Position: Left Occiput Posterior           Interventions/Resuscitation    Method: NICU Attended       Cord    Vessels: 3 vessels  Complications: Knot, Nuchal  Nuchal Intervention: reduced  Nuchal Cord Description: loose nuchal cord  Delayed Cord Clamping?: No  Cord Clamped Date/Time: 7/10/2020 10:30 AM  Cord Blood Disposition: Sent with Baby  Gases Sent?: Yes  Stem Cell Collection (by MD): No       Placenta    Placenta delivery date/time: 7/10/2020 1032  Placenta  removal: Manual removal           Labor Events:       labor: No     Labor Onset Date/Time:         Dilation Complete Date/Time:         Start Pushing Date/Time:       Rupture Date/Time:            Rupture type:          Fluid Amount:       Fluid Color:       Fluid Odor:       Membrane Status (PeriCalm):       Rupture Date/Time (PeriCalm):       Fluid Amount (PeriCalm):       Fluid Color (PeriCalm):        steroids: None     Antibiotics given for GBS: No     Induction: balloon dilation (Sherman)     Indications for induction:  Fetal Abnormality     Augmentation: amniotomy;oxytocin     Indications for augmentation:       Labor complications: Fetal Intolerance;Other Excessive Bleeding     Additional complications:          Cervical ripening:                     Delivery:      Episiotomy: None     Indication for Episiotomy:       Perineal Lacerations: None Repaired:      Periurethral Laceration:   Repaired:     Labial Laceration:   Repaired:     Sulcus Laceration:   Repaired:     Vaginal Laceration:   Repaired:     Cervical Laceration:   Repaired:     Repair suture:       Repair # of packets:       Last Value - EBL - Nursing (mL): 0     Sum - EBL - Nursing (mL): 0     Last Value - EBL - Anesthesia (mL):      Calculated QBL (mL): 1205      Vaginal Sweep Performed:       Surgicount Correct:         Other providers:       Anesthesia    Method: Epidural          Details (if applicable):  Trial of Labor Yes    Categorization: Primary    Priority: Emergency   Indications for : Fetal Intolerance of Labor   Incision Type: low transverse     Additional  information:  Forceps:    Vacuum:    Breech:    Observed anomalies    Other (Comments):            NOTE: THIS PATIENT IS A CANDIDATE FOR A TRIAL OF LABOR AFTER     MANOHAR Mcarthur MD  OBGYN PGY2

## 2020-07-10 NOTE — TRANSFER OF CARE
"Anesthesia Transfer of Care Note    Patient: Sonal BLANDON Demi    Procedure(s) Performed: * No procedures listed *    Patient location: Labor and Delivery    Anesthesia Type: epidural    Transport from OR: Transported from OR on room air with adequate spontaneous ventilation    Post pain: adequate analgesia    Post assessment: no apparent anesthetic complications    Post vital signs: stable    Level of consciousness: awake    Nausea/Vomiting: no nausea/vomiting    Complications: none    Transfer of care protocol was followed      Last vitals:   Visit Vitals  BP (!) 83/47   Pulse 75   Temp 36 °C (96.8 °F) (Temporal)   Resp 18   Ht 5' 9" (1.753 m)   Wt 87.3 kg (192 lb 7.4 oz)   LMP 11/28/2019 (Within Weeks)   SpO2 97%   Breastfeeding Unknown   BMI 28.42 kg/m²     "

## 2020-07-10 NOTE — PROGRESS NOTES
Strip note (pt declined cervical check; pitocin restarted at 315, not feeling ctx.)    NST: baseline 130, moderate variability, + accels, - decels. Category 1, reactive and reassuring.  Nogal: ctx q5 minutes    A/P:  - continue pitocin, now @ 12 mU/min  - pt ok to be checked in 1-2 hours    Sushma K. Reddy, MD  PGY-4, OBGYN

## 2020-07-11 LAB
BASOPHILS # BLD AUTO: 0.03 K/UL (ref 0–0.2)
BASOPHILS NFR BLD: 0.3 % (ref 0–1.9)
DIFFERENTIAL METHOD: ABNORMAL
EOSINOPHIL # BLD AUTO: 0.1 K/UL (ref 0–0.5)
EOSINOPHIL NFR BLD: 0.9 % (ref 0–8)
ERYTHROCYTE [DISTWIDTH] IN BLOOD BY AUTOMATED COUNT: 13 % (ref 11.5–14.5)
HCT VFR BLD AUTO: 27.5 % (ref 37–48.5)
HGB BLD-MCNC: 8.9 G/DL (ref 12–16)
IMM GRANULOCYTES # BLD AUTO: 0.08 K/UL (ref 0–0.04)
IMM GRANULOCYTES NFR BLD AUTO: 0.8 % (ref 0–0.5)
LYMPHOCYTES # BLD AUTO: 1.8 K/UL (ref 1–4.8)
LYMPHOCYTES NFR BLD: 16.9 % (ref 18–48)
MCH RBC QN AUTO: 29.7 PG (ref 27–31)
MCHC RBC AUTO-ENTMCNC: 32.4 G/DL (ref 32–36)
MCV RBC AUTO: 92 FL (ref 82–98)
MONOCYTES # BLD AUTO: 0.9 K/UL (ref 0.3–1)
MONOCYTES NFR BLD: 8.1 % (ref 4–15)
NEUTROPHILS # BLD AUTO: 7.6 K/UL (ref 1.8–7.7)
NEUTROPHILS NFR BLD: 73 % (ref 38–73)
NRBC BLD-RTO: 0 /100 WBC
PLATELET # BLD AUTO: 197 K/UL (ref 150–350)
PMV BLD AUTO: 10.1 FL (ref 9.2–12.9)
RBC # BLD AUTO: 3 M/UL (ref 4–5.4)
WBC # BLD AUTO: 10.45 K/UL (ref 3.9–12.7)

## 2020-07-11 PROCEDURE — 99231 SBSQ HOSP IP/OBS SF/LOW 25: CPT | Mod: ,,, | Performed by: OBSTETRICS & GYNECOLOGY

## 2020-07-11 PROCEDURE — 85025 COMPLETE CBC W/AUTO DIFF WBC: CPT

## 2020-07-11 PROCEDURE — 25000003 PHARM REV CODE 250: Performed by: STUDENT IN AN ORGANIZED HEALTH CARE EDUCATION/TRAINING PROGRAM

## 2020-07-11 PROCEDURE — 11000001 HC ACUTE MED/SURG PRIVATE ROOM

## 2020-07-11 PROCEDURE — 36415 COLL VENOUS BLD VENIPUNCTURE: CPT

## 2020-07-11 PROCEDURE — 99231 PR SUBSEQUENT HOSPITAL CARE,LEVL I: ICD-10-PCS | Mod: ,,, | Performed by: OBSTETRICS & GYNECOLOGY

## 2020-07-11 PROCEDURE — 94761 N-INVAS EAR/PLS OXIMETRY MLT: CPT

## 2020-07-11 PROCEDURE — 25000003 PHARM REV CODE 250: Performed by: OBSTETRICS & GYNECOLOGY

## 2020-07-11 RX ORDER — IBUPROFEN 600 MG/1
600 TABLET ORAL EVERY 6 HOURS
Status: DISCONTINUED | OUTPATIENT
Start: 2020-07-11 | End: 2020-07-13 | Stop reason: HOSPADM

## 2020-07-11 RX ADMIN — OXYCODONE 10 MG: 5 TABLET ORAL at 09:07

## 2020-07-11 RX ADMIN — IBUPROFEN 600 MG: 600 TABLET, FILM COATED ORAL at 11:07

## 2020-07-11 RX ADMIN — IBUPROFEN 600 MG: 600 TABLET, FILM COATED ORAL at 05:07

## 2020-07-11 RX ADMIN — PRENATAL VIT W/ FE FUMARATE-FA TAB 27-0.8 MG 1 TABLET: 27-0.8 TAB at 09:07

## 2020-07-11 RX ADMIN — ACETAMINOPHEN 650 MG: 325 TABLET ORAL at 05:07

## 2020-07-11 RX ADMIN — ACETAMINOPHEN 650 MG: 325 TABLET ORAL at 11:07

## 2020-07-11 RX ADMIN — MUPIROCIN: 20 OINTMENT TOPICAL at 08:07

## 2020-07-11 RX ADMIN — DOCUSATE SODIUM 200 MG: 100 CAPSULE, LIQUID FILLED ORAL at 08:07

## 2020-07-11 RX ADMIN — SIMETHICONE CHEW TAB 80 MG 80 MG: 80 TABLET ORAL at 11:07

## 2020-07-11 RX ADMIN — MUPIROCIN: 20 OINTMENT TOPICAL at 09:07

## 2020-07-11 RX ADMIN — SIMETHICONE CHEW TAB 80 MG 80 MG: 80 TABLET ORAL at 05:07

## 2020-07-11 RX ADMIN — OXYCODONE HYDROCHLORIDE AND ACETAMINOPHEN 1 TABLET: 10; 325 TABLET ORAL at 09:07

## 2020-07-11 RX ADMIN — OXYCODONE 10 MG: 5 TABLET ORAL at 12:07

## 2020-07-11 RX ADMIN — DOCUSATE SODIUM 200 MG: 100 CAPSULE, LIQUID FILLED ORAL at 09:07

## 2020-07-11 RX ADMIN — OXYCODONE HYDROCHLORIDE AND ACETAMINOPHEN 1 TABLET: 10; 325 TABLET ORAL at 05:07

## 2020-07-11 RX ADMIN — SIMETHICONE CHEW TAB 80 MG 80 MG: 80 TABLET ORAL at 01:07

## 2020-07-11 NOTE — LACTATION NOTE
"   07/10/20 1910   Maternal Assessment   Breast Shape Bilateral:;round   Breast Density Bilateral:;soft   Areola Bilateral:;elastic   Nipples Bilateral:;bulbous;everted   Maternal Infant Feeding   Maternal Emotional State relaxed;independent   Equipment Type   Breast Pump Type double electric, hospital grade   Breast Pump Flange Type hard   Breast Pump Flange Size 24 mm   Breast Pumping   Breast Pumping Interventions frequent pumping encouraged   Breast Pumping double electric breast pump utilized   Patient states she has pumped 4 x since this morning and has obtained several mls. Pump kit drying on paper towels at sink counter. Mother able to verbalize how to clean pump kit with assistance from FOB. Reviewed frequency and duration of pumping; labeling and storage of breast milk. Using 24 mm flanges. States that they feel "tight". Breast/nipple assessment done. Nipples appear large. 27 mm flanges placed with other pump pieces to be used at next session. Discussed suction level and use of Initiation setting.   "

## 2020-07-11 NOTE — PROGRESS NOTES
POSTPARTUM PROGRESS NOTE     Sonal Simms is a 39 y.o. female POD #0 status post Primary  section at 38w0d in a pregnancy complicated by abruption.   Patient is doing well this morning. She denies nausea, vomiting, fever or chills.  Patient reports mild abdominal pain that is well relieved by oral pain medications. Lochia is mild to moderate . Patient is voiding without difficulty and ambulating with no difficulty. She has passed flatus, and has not had BM.  Patient does plan to breast feed. Patient will defer to primary obstetric provider for contraception. Infant is in NICU secondary to tetralogy of Fallot.     Objective:       Temp:  [96.1 °F (35.6 °C)-98.2 °F (36.8 °C)] 98.2 °F (36.8 °C)  Pulse:  [47-93] 58  Resp:  [16-18] 18  SpO2:  [97 %-100 %] 99 %  BP: ()/(47-84) 123/64    General:   alert, appears stated age and cooperative   Lungs:   clear to auscultation bilaterally   Heart:   regular rate and rhythm, S1, S2 normal, no murmur, click, rub or gallop   Abdomen:  soft, non-tender; bowel sounds normal; no masses,  no organomegaly   Uterus:  firm located at the umblicus.        Incision: Bandage in place, clean, dry and intact   Extremities: peripheral pulses normal, no pedal edema, no clubbing or cyanosis     Lab Review  No results found for this or any previous visit (from the past 4 hour(s)).    I/O    Intake/Output Summary (Last 24 hours) at 7/10/2020 2210  Last data filed at 7/10/2020 1829  Gross per 24 hour   Intake 2300 ml   Output 3155 ml   Net -855 ml        Assessment:     Patient Active Problem List   Diagnosis    Alcohol use affecting pregnanccy--possible heavy use in early pregnancy: NO CURRENT use    Pregnancy    Anxiety    Panic attack    Birth Center Risk Assessment: 1-management on labor and Delivery due to fetal cardiac anomaly    Cystic thyroid nodule    Low-lying placenta in first trimester    Fetal cardiac anomaly complicating pregnancy, antepartum, single gestation     Poor fetal growth affecting management of mother in third trimester    S/P  section        Plan:   1. Postpartum care:  - Patient doing well. Continue routine management and advances.  - Continue PO pain meds. Pain well controlled.  - Heme: H/h  >8.9  - Encourage ambulation  - Contraception per primary provider   - Lactation consultation PRN  - Rh status pos    2. Placental abruption   - confirmed at intraoperatively   - post op cbc pending     3. Infant with fetal abnormality   - infant with diagnosis of tetralogy of fallot   - infants remains in NICU        Dispo: As patient meets milestones, will plan to discharge POD#2-4.    Neisha Malloy MD  OBGYN, PGY-3

## 2020-07-11 NOTE — LACTATION NOTE
reviewed education on pumping for nicu baby . Questions answered. Pt has lc number to call for any further questions.

## 2020-07-12 PROCEDURE — 94761 N-INVAS EAR/PLS OXIMETRY MLT: CPT

## 2020-07-12 PROCEDURE — 25000003 PHARM REV CODE 250: Performed by: OBSTETRICS & GYNECOLOGY

## 2020-07-12 PROCEDURE — 99231 PR SUBSEQUENT HOSPITAL CARE,LEVL I: ICD-10-PCS | Mod: ,,, | Performed by: OBSTETRICS & GYNECOLOGY

## 2020-07-12 PROCEDURE — 25000003 PHARM REV CODE 250: Performed by: STUDENT IN AN ORGANIZED HEALTH CARE EDUCATION/TRAINING PROGRAM

## 2020-07-12 PROCEDURE — 11000001 HC ACUTE MED/SURG PRIVATE ROOM

## 2020-07-12 PROCEDURE — 99231 SBSQ HOSP IP/OBS SF/LOW 25: CPT | Mod: ,,, | Performed by: OBSTETRICS & GYNECOLOGY

## 2020-07-12 RX ORDER — IRON POLYSACCHARIDE COMPLEX 150 MG
150 CAPSULE ORAL DAILY
Status: DISCONTINUED | OUTPATIENT
Start: 2020-07-12 | End: 2020-07-13 | Stop reason: HOSPADM

## 2020-07-12 RX ADMIN — MUPIROCIN: 20 OINTMENT TOPICAL at 09:07

## 2020-07-12 RX ADMIN — SIMETHICONE CHEW TAB 80 MG 80 MG: 80 TABLET ORAL at 09:07

## 2020-07-12 RX ADMIN — OXYCODONE HYDROCHLORIDE AND ACETAMINOPHEN 1 TABLET: 5; 325 TABLET ORAL at 09:07

## 2020-07-12 RX ADMIN — IBUPROFEN 600 MG: 600 TABLET, FILM COATED ORAL at 04:07

## 2020-07-12 RX ADMIN — SIMETHICONE CHEW TAB 80 MG 80 MG: 80 TABLET ORAL at 05:07

## 2020-07-12 RX ADMIN — PRENATAL VIT W/ FE FUMARATE-FA TAB 27-0.8 MG 1 TABLET: 27-0.8 TAB at 08:07

## 2020-07-12 RX ADMIN — MUPIROCIN: 20 OINTMENT TOPICAL at 08:07

## 2020-07-12 RX ADMIN — OXYCODONE HYDROCHLORIDE AND ACETAMINOPHEN 1 TABLET: 5; 325 TABLET ORAL at 05:07

## 2020-07-12 RX ADMIN — OXYCODONE HYDROCHLORIDE AND ACETAMINOPHEN 1 TABLET: 5; 325 TABLET ORAL at 08:07

## 2020-07-12 RX ADMIN — IBUPROFEN 600 MG: 600 TABLET, FILM COATED ORAL at 09:07

## 2020-07-12 RX ADMIN — DOCUSATE SODIUM 200 MG: 100 CAPSULE, LIQUID FILLED ORAL at 09:07

## 2020-07-12 RX ADMIN — DOCUSATE SODIUM 200 MG: 100 CAPSULE, LIQUID FILLED ORAL at 08:07

## 2020-07-12 RX ADMIN — SIMETHICONE CHEW TAB 80 MG 80 MG: 80 TABLET ORAL at 08:07

## 2020-07-12 RX ADMIN — IBUPROFEN 600 MG: 600 TABLET, FILM COATED ORAL at 08:07

## 2020-07-12 NOTE — PROGRESS NOTES
POSTPARTUM PROGRESS NOTE    Sonal Simms is a 39 y.o. female POD#2 status post Primary  section at 38w0d in a pregnancy complicated by abruption and fetal Tetralogy of Fallot.   Patient is doing well this morning. She denies nausea, vomiting, fever or chills.  Patient reports mild abdominal pain that is well relieved by oral pain medications. Lochia is mild to moderate. Patient is voiding without difficulty and ambulating with no difficulty. She has passed flatus, and has not had BM. Patient does plan to breast feed. Patient will defer to primary obstetric provider for contraception. Infant is in NICU secondary to Tetralogy of Fallot.     Objective:       Temp:  [97.9 °F (36.6 °C)-98.2 °F (36.8 °C)] 98 °F (36.7 °C)  Pulse:  [50-71] 67  Resp:  [16-20] 16  SpO2:  [97 %-100 %] 98 %  BP: ()/(54-65) 104/56    General:   alert, appears stated age and cooperative   Lungs:   clear to auscultation bilaterally   Heart:   regular rate and rhythm, S1, S2 normal, no murmur, click, rub or gallop   Abdomen:  soft, non-tender; bowel sounds normal; no masses,  no organomegaly   Uterus:  firm located at the umblicus.    Incision: Bandage in place, clean, dry and intact   Extremities: peripheral pulses normal, no pedal edema, no clubbing or cyanosis     CBC:  Recent Labs   Lab 20  0517   WBC 10.45   RBC 3.00*   HGB 8.9*   HCT 27.5*      MCV 92   MCH 29.7   MCHC 32.4      I/O    Intake/Output Summary (Last 24 hours) at 2020 2304  Last data filed at 7/10/2020 2330  Gross per 24 hour   Intake --   Output 100 ml   Net -100 ml        Assessment:     Patient Active Problem List   Diagnosis    Alcohol use affecting pregnanccy--possible heavy use in early pregnancy: NO CURRENT use    Pregnancy    Anxiety    Panic attack    Birth Center Risk Assessment: 1-management on labor and Delivery due to fetal cardiac anomaly    Cystic thyroid nodule    Low-lying placenta in first trimester    Fetal cardiac  anomaly complicating pregnancy, antepartum, single gestation    Poor fetal growth affecting management of mother in third trimester    S/P  section     39 y.o.  now s/p 1LTCS 2/2 placental abruption at 38w0d in a pregnancy complicated by fetal ToF.      Plan:     1. Postpartum care:  - Patient doing well. Continue routine management and advances.  - Continue PO pain meds. Pain well controlled.  - Heme: H/h  >8.9  - Encourage ambulation  - Contraception per primary provider   - Lactation consultation PRN  - Rh status pos    2. Placental abruption   - indication for  delivery  - confirmed at intraoperatively     3. Infant with fetal abnormality   - infant with diagnosis of tetralogy of fallot, in NICU    4. Anxiety  - Denies symptoms currently, not on medication   - 1-2 week PP mood check    5. Anemia  - Asymptomatic  - Start PO iron and colace    Dispo: As patient meets milestones, will plan to discharge POD#2-4.    Silvano De Los Santos M.D., PGY4  Obstetrics & Gynecology

## 2020-07-12 NOTE — LACTATION NOTE
This note was copied from a baby's chart.     07/12/20 1500   Maternal Assessment   Breast Shape Bilateral:;round   Breast Density Bilateral:;soft   Areola Bilateral:;elastic   Nipples Bilateral:;everted   Maternal Infant Feeding   Maternal Emotional State assist needed   Infant Positioning cradle;cross-cradle   Signs of Milk Transfer audible swallow;infant jaw motion present   Nipple Shape After Feeding, Left   (slightly pinched)   Latch Assistance yes   Breastfeeding Supplementation   Nipple Used For Feeding slow flow   Baby latched shallowly to breast in cradle hold. Encouraged mom to remove baby from breast and relatch baby deeper to breast. Assistance given. Baby multiple times thrust tongue and slipped to tip of nipple. After multiple latch attempts baby was able to achieve an effective latch. Good tugs and pulls observed. Swallows audible. Lc called attention to mom of baby's rhythmic sucking at the breast. Encouraged mom to pay close attention to baby at breast to notice when baby is sucking effectively and transferring breastmilk.Mom not overly receptive to breastfeeding assistance. Encouraged mom to supplement baby after feeding and to pump for stimulation. Discharge breastfeeding education provided. Fob given mother/baby breastfeeding guide.Mom encouraged to pump at least 3-4 times daily after feedings for extra breast stimulation and to supplement baby if baby seems hungry after breastfeeding. Mother has community resources and  contact number.

## 2020-07-13 VITALS
SYSTOLIC BLOOD PRESSURE: 107 MMHG | TEMPERATURE: 98 F | RESPIRATION RATE: 18 BRPM | WEIGHT: 192.44 LBS | DIASTOLIC BLOOD PRESSURE: 53 MMHG | BODY MASS INDEX: 28.5 KG/M2 | HEIGHT: 69 IN | OXYGEN SATURATION: 100 % | HEART RATE: 69 BPM

## 2020-07-13 PROBLEM — O36.5930 POOR FETAL GROWTH AFFECTING MANAGEMENT OF MOTHER IN THIRD TRIMESTER: Status: RESOLVED | Noted: 2020-07-08 | Resolved: 2020-07-13

## 2020-07-13 PROBLEM — O35.BXX0 FETAL CARDIAC ANOMALY COMPLICATING PREGNANCY, ANTEPARTUM, SINGLE GESTATION: Status: RESOLVED | Noted: 2020-06-05 | Resolved: 2020-07-13

## 2020-07-13 PROCEDURE — 90471 IMMUNIZATION ADMIN: CPT | Performed by: OBSTETRICS & GYNECOLOGY

## 2020-07-13 PROCEDURE — 25000003 PHARM REV CODE 250: Performed by: OBSTETRICS & GYNECOLOGY

## 2020-07-13 PROCEDURE — 25000003 PHARM REV CODE 250: Performed by: STUDENT IN AN ORGANIZED HEALTH CARE EDUCATION/TRAINING PROGRAM

## 2020-07-13 PROCEDURE — 99238 PR HOSPITAL DISCHARGE DAY,<30 MIN: ICD-10-PCS | Mod: ,,, | Performed by: OBSTETRICS & GYNECOLOGY

## 2020-07-13 PROCEDURE — 63600175 PHARM REV CODE 636 W HCPCS: Performed by: OBSTETRICS & GYNECOLOGY

## 2020-07-13 PROCEDURE — 99238 HOSP IP/OBS DSCHRG MGMT 30/<: CPT | Mod: ,,, | Performed by: OBSTETRICS & GYNECOLOGY

## 2020-07-13 PROCEDURE — 90715 TDAP VACCINE 7 YRS/> IM: CPT | Performed by: OBSTETRICS & GYNECOLOGY

## 2020-07-13 RX ORDER — DOCUSATE SODIUM 100 MG/1
100 CAPSULE, LIQUID FILLED ORAL 2 TIMES DAILY
Qty: 60 CAPSULE | Refills: 1 | Status: SHIPPED | OUTPATIENT
Start: 2020-07-13 | End: 2021-07-13

## 2020-07-13 RX ORDER — OXYCODONE AND ACETAMINOPHEN 5; 325 MG/1; MG/1
1 TABLET ORAL EVERY 4 HOURS PRN
Qty: 25 TABLET | Refills: 0 | OUTPATIENT
Start: 2020-07-13 | End: 2022-01-22

## 2020-07-13 RX ORDER — IBUPROFEN 800 MG/1
800 TABLET ORAL EVERY 8 HOURS PRN
Qty: 60 TABLET | Refills: 1 | OUTPATIENT
Start: 2020-07-13 | End: 2022-01-22

## 2020-07-13 RX ADMIN — IBUPROFEN 600 MG: 600 TABLET, FILM COATED ORAL at 02:07

## 2020-07-13 RX ADMIN — Medication 150 MG: at 08:07

## 2020-07-13 RX ADMIN — OXYCODONE HYDROCHLORIDE AND ACETAMINOPHEN 1 TABLET: 5; 325 TABLET ORAL at 01:07

## 2020-07-13 RX ADMIN — IBUPROFEN 600 MG: 600 TABLET, FILM COATED ORAL at 06:07

## 2020-07-13 RX ADMIN — OXYCODONE HYDROCHLORIDE AND ACETAMINOPHEN 1 TABLET: 5; 325 TABLET ORAL at 08:07

## 2020-07-13 RX ADMIN — CLOSTRIDIUM TETANI TOXOID ANTIGEN (FORMALDEHYDE INACTIVATED), CORYNEBACTERIUM DIPHTHERIAE TOXOID ANTIGEN (FORMALDEHYDE INACTIVATED), BORDETELLA PERTUSSIS TOXOID ANTIGEN (GLUTARALDEHYDE INACTIVATED), BORDETELLA PERTUSSIS FILAMENTOUS HEMAGGLUTININ ANTIGEN (FORMALDEHYDE INACTIVATED), BORDETELLA PERTUSSIS PERTACTIN ANTIGEN, AND BORDETELLA PERTUSSIS FIMBRIAE 2/3 ANTIGEN 0.5 ML: 5; 2; 2.5; 5; 3; 5 INJECTION, SUSPENSION INTRAMUSCULAR at 08:07

## 2020-07-13 RX ADMIN — OXYCODONE HYDROCHLORIDE AND ACETAMINOPHEN 1 TABLET: 5; 325 TABLET ORAL at 02:07

## 2020-07-13 RX ADMIN — PRENATAL VIT W/ FE FUMARATE-FA TAB 27-0.8 MG 1 TABLET: 27-0.8 TAB at 08:07

## 2020-07-13 RX ADMIN — DOCUSATE SODIUM 200 MG: 100 CAPSULE, LIQUID FILLED ORAL at 08:07

## 2020-07-13 NOTE — PLAN OF CARE
Patient safety maintained, side rails up, bed low and locked position. Pt ambulating and voiding independently. Pain well controlled with PRN pain medication. Fundus midline, firm, with light lochia. Incision site EZ; incision clean, dry, and intact. Significant other at bedside and assisting in patient's care. Parents visit infant in NICU. Will continue to monitor.

## 2020-07-13 NOTE — DISCHARGE SUMMARY
Delivery Discharge Summary  Obstetrics      Primary OB Clinician: Bettie Palacios MD     Admission date: 2020  Discharge date: 2020    Disposition: To home, self care    Discharge Diagnosis List:  Patient Active Problem List   Diagnosis    Alcohol use affecting pregnanccy--possible heavy use in early pregnancy: NO CURRENT use    Pregnancy    Anxiety    Panic attack    Birth Center Risk Assessment: 1-management on labor and Delivery due to fetal cardiac anomaly    Cystic thyroid nodule    Low-lying placenta in first trimester    S/P  section       Procedure: , due to placental abruption and NRFHT     Hospital Course:  Sonal Simms is a 39 y.o. now , POD #3 who was admitted on 2020 at 37w5d for IOL. Patient was subsequently admitted to labor and delivery unit with signed consents.     Labor course was complicated by fetal intolerance of labor likely 2/2 placenta abruption; see notes for details, and decision was made to proceed with delivery via  which was performed without complications.    Please see delivery note for further details. Her postpartum course was uncomplicated. On discharge day, patient's pain is controlled with oral pain medications. Pt is tolerating ambulation without SOB or CP, and regular diet without N/V. Reports lochia is mild. Denies any HA, vision changes, F/C, LE swelling. Denies any breast pain/soreness.    Pt in stable condition and ready for discharge. She has been instructed to start and/or continue medications and follow up with her obstetrics provider as listed below.    Pertinent studies:  CBC  Recent Labs   Lab 20  2102 20  0517   WBC 9.58 10.45   HGB 11.0* 8.9*   HCT 33.2* 27.5*   MCV 90 92    197        Immunization History   Administered Date(s) Administered    Tdap 2020        Delivery:    Episiotomy: None   Lacerations: None   Repair suture:     Repair # of packets:     Blood loss (ml): 0     Birth  information:  YOB: 2020   Time of birth: 10:30 AM   Sex: female   Delivery type: , Low Transverse   Gestational Age: 38w0d    Delivery Clinician:      Other providers:       Additional  information:  Forceps:    Vacuum:    Breech:    Observed anomalies      Living?:           APGARS  One minute Five minutes Ten minutes   Skin color:         Heart rate:         Grimace:         Muscle tone:         Breathing:         Totals: 9  9        Placenta: Delivered:       appearance      Patient Instructions:   Current Discharge Medication List      START taking these medications    Details   docusate sodium (COLACE) 100 MG capsule Take 1 capsule (100 mg total) by mouth 2 (two) times daily.  Qty: 60 capsule, Refills: 1      ibuprofen (ADVIL,MOTRIN) 800 MG tablet Take 1 tablet (800 mg total) by mouth every 8 (eight) hours as needed for Pain (Take scheduled for next 5-7 days, then as needed for pain).  Qty: 60 tablet, Refills: 1      oxyCODONE-acetaminophen (PERCOCET) 5-325 mg per tablet Take 1 tablet by mouth every 4 (four) hours as needed.  Qty: 25 tablet, Refills: 0    Comments: Quantity prescribed more than 7 day supply? No         CONTINUE these medications which have NOT CHANGED    Details   hydrOXYzine pamoate (VISTARIL) 50 MG Cap Take 1 capsule (50 mg total) by mouth every 6 (six) hours as needed.  Qty: 90 capsule, Refills: 1    Associated Diagnoses: Anxiety      multivitamin capsule Take 1 capsule by mouth once daily.             Discharge Procedure Orders   Diet Adult Regular     Pelvic Rest   Order Comments: Nothing in vagina until seen in clinic     Lifting restrictions   Order Comments: No lifting anything larger than baby for 6 weeks     No driving until:   Order Comments: No driving while taking narcotics     Notify your health care provider if you experience any of the following:  temperature >100.4     Notify your health care provider if you experience any of the following:  persistent  nausea and vomiting or diarrhea     Notify your health care provider if you experience any of the following:  severe uncontrolled pain     Notify your health care provider if you experience any of the following:  redness, tenderness, or signs of infection (pain, swelling, redness, odor or green/yellow discharge around incision site)     Notify your health care provider if you experience any of the following:  difficulty breathing or increased cough     Notify your health care provider if you experience any of the following:  severe persistent headache     Notify your health care provider if you experience any of the following:  worsening rash     Notify your health care provider if you experience any of the following:  persistent dizziness, light-headedness, or visual disturbances     Notify your health care provider if you experience any of the following:   Order Comments: Heavy vaginal bleeding     Activity as tolerated       Follow-up Information     [facility] In 6 weeks.    Specialty: Obstetrics and Gynecology  Why: Postpartum Follow-Up, Post-Op Follow Up  Contact information:  [address] [place] [address] [phone]           [facility] [address]. Schedule an appointment as soon as possible for a visit in 6 weeks.    Specialty: Obstetrics and Gynecology  Why: Postpartum Follow-Up  Contact information:  [address] [facility] [address] [phone]  Additional information:  [facility] 4th Floor   Please park in [place] [address] and use [place] elevators                  MANOHAR Mcarthur MD  OBGYN PGY2

## 2020-07-13 NOTE — PLAN OF CARE
Lactation note:  Mom to continue to pump 8 or more times a day and practice breastfeeding with infant in NICU. She has bottles and labels to pump into and store milk at home. Mom renting a symphony breast pump for one week until she gets a personal use pump through insurance. Mom has breastfeeding guide for reference with numbers.

## 2020-07-13 NOTE — PROGRESS NOTES
POSTPARTUM PROGRESS NOTE    Sonal Simms is a 39 y.o. female POD#3 status post Primary  section at 38w0d in a pregnancy complicated by abruption and fetal Tetralogy of Fallot.   Patient is doing well this morning. She denies nausea, vomiting, fever or chills.  Patient reports mild abdominal pain that is well relieved by oral pain medications. Lochia is mild to moderate. Patient is voiding without difficulty and ambulating with no difficulty. She has passed flatus and had a BM. Patient does plan to breast feed. Patient will defer to primary obstetric provider for contraception. Infant is in NICU secondary to Tetralogy of Fallot.     Objective:       Temp:  [98.3 °F (36.8 °C)-98.5 °F (36.9 °C)] 98.3 °F (36.8 °C)  Pulse:  [77-86] 86  Resp:  [16-18] 18  SpO2:  [97 %-100 %] 97 %  BP: (118-122)/(67-75) 118/75    General:   alert, appears stated age and cooperative   Lungs:   clear to auscultation bilaterally   Heart:   regular rate and rhythm, S1, S2 normal, no murmur, click, rub or gallop   Abdomen:  soft, non-tender; bowel sounds normal; no masses,  no organomegaly   Uterus:  firm located at the umblicus.    Incision: Clean, dry and intact   Extremities: No pedal edema     CBC:  No results for input(s): WBC, RBC, HGB, HCT, PLT, MCV, MCH, MCHC in the last 24 hours.   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  now s/p 1LTCS 2/2 placental  abruption at 38w0d in a pregnancy complicated by fetal ToF.      Plan:     1. Postpartum care:  - Patient doing well. Continue routine management and advances.  - Continue PO pain meds. Pain well controlled.  - Heme: H/h  >8.9  - Encourage ambulation  - Contraception per primary provider   - Lactation consultation PRN  - Rh status pos    2. Placental abruption   - indication for  delivery  - confirmed at intraoperatively     3. Infant with fetal abnormality   - infant with diagnosis of tetralogy of fallot, in NICU    4. Anxiety  - Denies symptoms currently, not on medication   - 1-2 week PP mood check    5. Anemia  - Asymptomatic  - Start PO iron and colace    Dispo: As patient meets milestones, will plan to discharge POD#3-4.    Hollie Feldman MD PGY-1  Obstetrics and Gynecology

## 2020-07-13 NOTE — PLAN OF CARE
COPIED FROM INFANT'S CHART     07/13/20 1200   Discharge Assessment   Assessment Type Discharge Planning Assessment   Confirmed/corrected address and phone number on facesheet? Yes   Assessment information obtained from? Caregiver  (mom)   Discharge Plan A Home with family;[facility] Steps   Patient/Family in Agreement with Plan yes     Sw met with mom at pts bedside. Sw explained the role of the sw. Mom was easily engaged. Mom verified demographics. Mom reported that she has the needed items for pt (car seat, crib, etc). Mom intends on providing breastmilk and has ordered a breast pump. Mom is not linked with [facility]. Pts pediatrician will be Dr. Marla Seymour. Mom expressed that she has support and parents are in a relationship. Mom stated that pt will have [facility] Medicaid. No needs reported.       Education: Information given on CPR classes; Physician/NNP daily rounds; and Postpartum Depression signs.     Potential Discharge Needs: [facility]?     Chavo Huang, [facility]  NICU   Phone [phone] Ext. [phone]  [email or web address]

## 2020-07-13 NOTE — LACTATION NOTE
Lactation note:  To room to assist with discharge teaching and rental of breast pump. Mom had discharge teaching with Leonor yesterday and has no questions. She will continue to breast pump 8 or more times daily and put baby to breast for practice with breastfeeding. Rented a symphony breast pump for one week until she gets a personal use one through insurance.  phone number on board for further needs.

## 2020-07-13 NOTE — PHYSICIAN QUERY
PT Name: Sonal Simms  MR #: [id number]    HEMATOLOGY CLARIFICATION      CDS/: ROSALINDA CarmenN,RNC-MNN       Contact information:[email or web address]    This form is a permanent document in the medical record.      Query Date: July 13, 2020    By submitting this query, we are merely seeking further clarification of documentation. Please utilize your independent clinical judgment when addressing the question(s) below.    The Medical Record contains the following:   Indicators  Supporting Clinical Findings Location in Medical Record   X Anemia documented Anemia  -Asymptomatic OB Progress note 7/13@634am   X H&H Heme: H/h 11/33 >8.9/27 OB Progress note 7/13@634am    BP                    HR      GI bleeding documented     X Acute bleeding (Non GI site) Estimated Blood Loss: Calculated QBL (Quantitative Blood Loss) (mL): 1205  L&D Delivery note 7/12    Transfusion(s)     X Acute/Chronic illness s/p 1LTCS 2/2 placental abruption at 38w0d in a pregnancy complicated by fetal ToF OB Progress note 7/13@634am   X Treatments Start PO iron and colace OB Progress note 7/13@634am    Other       Provider, please specify diagnosis or diagnoses associated with above clinical findings.   [   ] Acute blood loss anemia    [ x  ] Acute blood loss anemia expected post-operatively    [   ] Iron deficiency anemia    [   ] Anemia, unspecified    [   ] Other Hematological Diagnosis (please specify): _________________   [   ] Clinically Undetermined     Present on admission (POA) status:   [   ] Yes (Y)                          [  ] Clinically Undetermined (W)  [   ] No (N)                            [   ] Documentation insufficient to determine if condition is POA (U)          Please document in your progress notes daily for the duration of treatment, until resolved, and include in your discharge summary.

## 2020-07-13 NOTE — DISCHARGE INSTRUCTIONS
Preparation and Hygiene:    1. Shower daily.  2. Wear a clean bra each day and wash daily in warm soapy water.  3. Change wet or moist breast pads frequently.  Moist pads can promote growth of germs.  4. Actively wash your hands, paying close attention to the area around and under your fingernails, thoroughly with soap and water for 15 seconds before pumping or handling your milk.  Re-wash your hands if you touch anything (scratching your nose, answering the phone, etc) while pumping or handling your milk.   5. Before pumping your breasts, assemble the pump collection kit and have ready the sterile container and labels.  Place these items on a clean surface next to the breastpump.  6. Each time after you have finished pumping, take apart all of the parts of the breastpump collection kit and place them in a separate cleaning container (do not place them in the sink).  Be sure to remove the yellow valve from the breastshield and separate the white membrane from the yellow valve.  Rinse all of these parts with cool water.  Then use a new sponge and/or bottle brush and dishwashing detergent to clean the parts.  Rinse off the soapy water with cool water and air dry on a clean towel covered with a clean cloth.  All parts may also be washed after each use in the top rack of a .  7. Once each day, sterilize all of the parts of the breastpump collection kit.  This can be done by boiling the kit parts for 10 minutes or by using a Quick Clean Micro-Steam Bag made by [employer]  8. If condensation appears in the tubing, continue to run the pump with the tubing attached for 1-2 minutes or until the tubing is dry.   9. Notify your babys nurse or doctor if you become ill or need to take any medication, even over-the-counter medicines.        Collection and Storage of Expressed Breastmilk:         1. Pump your breasts at least 8-10 times every 24 hours.  Double pump (both breasts at  the same time) for at least 15-20  minutes using the most suction that is comfortable.    2. Write the date and time of pumping and the name of any medications you are takingon the babys pre-printed hospital identification label.   3. Place your babys pre-printed hospital identification label on each container of breastmilk.  Additional pre-printed labels can be obtained from your babys nurse.  If your expressed breastmilk is not correctly labeled, the nurse cannot feed the milk to the baby.       4. Place a brightly colored sticker on the top of each container of milk pumped during the first 30 days.  This identifies the milk as special and having higher levels of nutrients and anti-infective properties that are so important for your baby.  Additional stickers can be obtained from the lactation consultants or your babys nurse.  5.   Do not touch the inside of the storage containers or lids.  6.      Pour the amount of expressed milk needed for 1 of your babys feedings into each   storage container. Use a new container(s) for each pumping.  Additional storage   containers can be obtained from your babys nurse.        7.       Tightly screw the lid onto the container and place immediately into the       refrigerator fordaily transportation to the hospital.   Do not freeze your milk      unless asked to do so by your babys nurse.  However, if you are not able to      visit your baby each day, place the expressed breastmilk in the freezer.  8.   Expressed breastmilk should be refrigerated or frozen within 1 hour of      pumping.  9.      Do not store expressed breastmilk on the door of your refrigerator or freezer where the temperature is warmer.         Transportation of Expressed Breastmilk:    1. Refrigerated breastmilk or frozen milk should be packed tightly together in a cooler with frozen, blue gel-packs to keep the milk frozen.  DO NOT USE ICE CUBES (WET ICE) TO TRANSPORT FROZEN MILK.   A clean towel can be used to fill any extra space  between containers of frozen milk.  2.    Bring your expressed milk from home each time you visit the baby.      NICU lactation number:  [phone]  MBU lactation number:  [phone]

## 2020-07-13 NOTE — ANESTHESIA POSTPROCEDURE EVALUATION
Anesthesia Post Evaluation    Patient: Sonal Simms    Procedure(s) Performed: Procedure(s) (LRB):   SECTION (N/A)    Final Anesthesia Type: epidural    Patient location during evaluation: labor & delivery  Patient participation: Yes- Able to Participate  Level of consciousness: awake and alert and oriented  Post-procedure vital signs: reviewed and stable  Pain management: adequate  Airway patency: patent  MARLO mitigation strategies: Multimodal analgesia and Use of major conduction anesthesia (spinal/epidural) or peripheral nerve block  PONV status at discharge: No PONV  Anesthetic complications: no      Cardiovascular status: blood pressure returned to baseline and hemodynamically stable  Respiratory status: unassisted, spontaneous ventilation and room air  Hydration status: euvolemic  Follow-up not needed.          Vitals Value Taken Time   /53 20 0751   Temp 36.8 °C (98.3 °F) 20 0751   Pulse 69 20 0751   Resp 20 20 0807   SpO2 100 % 20 0751         Event Time   Out of Recovery 07/10/2020 13:36:00         Pain/Vicky Score: Pain Rating Prior to Med Admin: 4 (2020  8:07 AM)  Pain Rating Post Med Admin: 0 (2020  6:35 AM)

## 2020-07-15 LAB
FINAL PATHOLOGIC DIAGNOSIS: NORMAL
GROSS: NORMAL

## 2020-08-10 ENCOUNTER — POSTPARTUM VISIT (OUTPATIENT)
Dept: OBSTETRICS AND GYNECOLOGY | Facility: CLINIC | Age: 40
End: 2020-08-10
Payer: MEDICAID

## 2020-08-10 VITALS
BODY MASS INDEX: 27.35 KG/M2 | WEIGHT: 184.63 LBS | SYSTOLIC BLOOD PRESSURE: 114 MMHG | DIASTOLIC BLOOD PRESSURE: 82 MMHG | HEIGHT: 69 IN

## 2020-08-10 PROCEDURE — 99213 OFFICE O/P EST LOW 20 MIN: CPT | Mod: PBBFAC | Performed by: ADVANCED PRACTICE MIDWIFE

## 2020-08-10 PROCEDURE — 99999 PR PBB SHADOW E&M-EST. PATIENT-LVL III: ICD-10-PCS | Mod: PBBFAC,,, | Performed by: ADVANCED PRACTICE MIDWIFE

## 2020-08-10 PROCEDURE — 99999 PR PBB SHADOW E&M-EST. PATIENT-LVL III: CPT | Mod: PBBFAC,,, | Performed by: ADVANCED PRACTICE MIDWIFE

## 2020-08-10 PROCEDURE — 59430 PR CARE AFTER DELIVERY ONLY: ICD-10-PCS | Mod: ,,, | Performed by: ADVANCED PRACTICE MIDWIFE

## 2020-08-10 NOTE — PROGRESS NOTES
Postpartum Visit  Sonal Simms is a 39 y.o. female  is here for a postpartum visit. She is 6 weeks postpartum following a CS- low trans. delivery, of a female infant weighinlb 11oz, with Anesthesia: epidural. . The delivery was at 38w 0d.     Baby doing well, surgery at 6 months.    Pregnancy was complicated by: anxiety.      OB History    Para Term  AB Living   5 2 2 0 2 2   SAB TAB Ectopic Multiple Live Births   1 0 0 0 1      # Outcome Date GA Lbr Ponce/2nd Weight Sex Delivery Anes PTL Lv   5 Term 07/10/20 38w0d   F CS-LTranv EPI N MIKIE      Complications: Fetal Intolerance, Other Excessive Bleeding   4             3 AB            2 SAB            1 Term                Postpartum course has been uncomplicated.   Bleeding no bleeding, stopped 3 days ago. Bowel/ bladder function is normal.   Her last pap was .    Patient is not sexually active. Desired contraception method is none.   Postpartum depression screening: negative. EPDS score: 1      Baby's course has been uncomplicated for tetrology of fallot. Baby is feeding by breast. Pumps and feeds breast milk.   Had cardiologist visit. Did EKG and echocardiogram.    ROS:  GENERAL: No fever, chills, fatigability.  VULVAR: No pain, no lesions and no itching.  VAGINAL: No relaxation, no itching, no discharge, no abnormal bleeding and no lesions.  ABDOMEN: No abdominal pain. Denies nausea. Denies vomiting. No diarrhea. No constipation  BREAST: Denies pain. No lumps. No discharge.  URINARY: No incontinence, no nocturia, no frequency and no dysuria.  CARDIOVASCULAR: No chest pain. No shortness of breath. No leg cramps.  NEUROLOGICAL: No headaches. No vision changes.    Past Medical History:   Diagnosis Date    Anxiety     Elevated liver enzymes     secondary to alcohol use    Panic attack 2020    Patient states that this was due to exhaustion after having difficulty falling asleep due to anxiety     Past Surgical History:    Procedure Laterality Date    ANTERIOR CRUCIATE LIGAMENT REPAIR      age 24     SECTION N/A 7/10/2020    Procedure:  SECTION;  Surgeon: Rosalba Loyd MD;  [facility];  Service: OB/GYN;  Laterality: N/A;     Review of patient's allergies indicates:  No Known Allergies    Current Outpatient Medications:     docusate sodium (COLACE) 100 MG capsule, Take 1 capsule (100 mg total) by mouth 2 (two) times daily., Disp: 60 capsule, Rfl: 1    hydrOXYzine pamoate (VISTARIL) 50 MG Cap, Take 1 capsule (50 mg total) by mouth every 6 (six) hours as needed., Disp: 90 capsule, Rfl: 1    ibuprofen (ADVIL,MOTRIN) 800 MG tablet, Take 1 tablet (800 mg total) by mouth every 8 (eight) hours as needed for Pain (Take scheduled for next 5-7 days, then as needed for pain)., Disp: 60 tablet, Rfl: 1    multivitamin capsule, Take 1 capsule by mouth once daily., Disp: , Rfl:     oxyCODONE-acetaminophen (PERCOCET) 5-325 mg per tablet, Take 1 tablet by mouth every 4 (four) hours as needed., Disp: 25 tablet, Rfl: 0      There were no vitals filed for this visit.    General appearance - alert, well appearing, and in no distress  Mental status - alert, oriented to person, place, and time  Skin - coloration normal for race, good turgor, warm to touch, no rashes  Abdomen - soft, nontender, nondistended, no masses or organomegaly  Pfannenstiel incision: Clean, dry, intact - healing well.  Pelvic -   External genitalia postpartum: normal, well-healed, without lesions or masses.  Normal female hair distribution. Adequate perineal body. Urethral meatus without lesions or prolapse. Urethra: no masses, tenderness, or scarring.  Bladder: without tenderness or masses.  Vaginal mucosa moist and pink, normal rugae, without lesions, abnormal discharge, or foul odor.  Cervix pink, no lesions, no cervical motion tenderness.  Uterus: midline, non tender, smooth, not enlarged  Extremities - no edema, redness or tenderness in the  calves or thighs      There are no diagnoses linked to this encounter.    Discussed contraception - pt desires undecided  Counseling regarding resuming normal activities of exercise and work.  Postpartum precautions reviewed    Routine follow up in 1 yr    Wendy Gerhardt, CNM

## 2020-08-25 ENCOUNTER — TELEPHONE (OUTPATIENT)
Dept: OBSTETRICS AND GYNECOLOGY | Facility: HOSPITAL | Age: 40
End: 2020-08-25

## 2020-08-25 NOTE — TELEPHONE ENCOUNTER
Called pt regarding UTI s/s.  No answer  LMOM that she has appt tomorrow and to call me for any questions.

## 2020-08-26 ENCOUNTER — OFFICE VISIT (OUTPATIENT)
Dept: OBSTETRICS AND GYNECOLOGY | Facility: CLINIC | Age: 40
End: 2020-08-26
Payer: MEDICAID

## 2020-08-26 VITALS
DIASTOLIC BLOOD PRESSURE: 78 MMHG | WEIGHT: 184.19 LBS | HEIGHT: 69 IN | BODY MASS INDEX: 27.28 KG/M2 | SYSTOLIC BLOOD PRESSURE: 118 MMHG

## 2020-08-26 DIAGNOSIS — R30.0 DYSURIA: Primary | ICD-10-CM

## 2020-08-26 DIAGNOSIS — R35.0 FREQUENCY OF URINATION: ICD-10-CM

## 2020-08-26 PROCEDURE — 99213 OFFICE O/P EST LOW 20 MIN: CPT | Mod: S$PBB,24,, | Performed by: ADVANCED PRACTICE MIDWIFE

## 2020-08-26 PROCEDURE — 99999 PR PBB SHADOW E&M-EST. PATIENT-LVL II: CPT | Mod: PBBFAC,,, | Performed by: ADVANCED PRACTICE MIDWIFE

## 2020-08-26 PROCEDURE — 87086 URINE CULTURE/COLONY COUNT: CPT

## 2020-08-26 PROCEDURE — 87077 CULTURE AEROBIC IDENTIFY: CPT

## 2020-08-26 PROCEDURE — 99999 PR PBB SHADOW E&M-EST. PATIENT-LVL II: ICD-10-PCS | Mod: PBBFAC,,, | Performed by: ADVANCED PRACTICE MIDWIFE

## 2020-08-26 PROCEDURE — 87186 SC STD MICRODIL/AGAR DIL: CPT

## 2020-08-26 PROCEDURE — 99213 PR OFFICE/OUTPT VISIT, EST, LEVL III, 20-29 MIN: ICD-10-PCS | Mod: S$PBB,24,, | Performed by: ADVANCED PRACTICE MIDWIFE

## 2020-08-26 PROCEDURE — 87088 URINE BACTERIA CULTURE: CPT

## 2020-08-26 PROCEDURE — 99212 OFFICE O/P EST SF 10 MIN: CPT | Mod: PBBFAC | Performed by: ADVANCED PRACTICE MIDWIFE

## 2020-08-26 RX ORDER — NITROFURANTOIN 25; 75 MG/1; MG/1
100 CAPSULE ORAL 2 TIMES DAILY
Qty: 10 CAPSULE | Refills: 0 | Status: SHIPPED | OUTPATIENT
Start: 2020-08-26 | End: 2020-08-31

## 2020-08-26 NOTE — PROGRESS NOTES
"Subjective:      Sonal Simms is a 39 y.o. female who complains of dysuria, frequency and urgency for 3 days. Patient denies back pain and fever.  Patient does have a history of occasional UTI.  Patient does not have a history of pyelonephritis        Objective:      /78   Ht 5' 9" (1.753 m)   Wt 83.6 kg (184 lb 3.1 oz)   LMP 11/28/2019 (Within Weeks)   BMI 27.20 kg/m²   General: alert, appears stated age and cooperative   Abdomen: Pt left after dropping culture and we didn't have time to complete PE   Back: CVA tenderness absent per pt report    : defer exam     Laboratory:    Micro exam: urine culture sent .      Assessment:      UTI      Plan:  Plan:      1. Medications: nitrofurantoin  2. Maintain adequate hydration  3. Follow up if symptoms not improving, and prn.     "

## 2020-08-29 ENCOUNTER — PATIENT MESSAGE (OUTPATIENT)
Dept: OBSTETRICS AND GYNECOLOGY | Facility: HOSPITAL | Age: 40
End: 2020-08-29

## 2020-08-29 DIAGNOSIS — R82.71 BACTERIURIA: Primary | ICD-10-CM

## 2020-08-29 LAB — BACTERIA UR CULT: ABNORMAL

## 2020-08-29 RX ORDER — AMOXICILLIN AND CLAVULANATE POTASSIUM 875; 125 MG/1; MG/1
1 TABLET, FILM COATED ORAL EVERY 12 HOURS
Qty: 14 TABLET | Refills: 0 | Status: SHIPPED | OUTPATIENT
Start: 2020-08-29 | End: 2020-09-05

## 2020-09-07 PROBLEM — Z13.9 RISK AND FUNCTIONAL ASSESSMENT: Status: RESOLVED | Noted: 2020-02-15 | Resolved: 2020-09-07

## 2021-04-22 ENCOUNTER — PATIENT MESSAGE (OUTPATIENT)
Dept: UROLOGY | Facility: CLINIC | Age: 41
End: 2021-04-22

## 2021-04-26 ENCOUNTER — PATIENT MESSAGE (OUTPATIENT)
Dept: RESEARCH | Facility: HOSPITAL | Age: 41
End: 2021-04-26

## 2022-01-22 ENCOUNTER — HOSPITAL ENCOUNTER (EMERGENCY)
Facility: OTHER | Age: 42
Discharge: HOME | End: 2022-01-22
Attending: EMERGENCY MEDICINE
Payer: OTHER GOVERNMENT

## 2022-01-22 VITALS
DIASTOLIC BLOOD PRESSURE: 92 MMHG | SYSTOLIC BLOOD PRESSURE: 149 MMHG | WEIGHT: 140 LBS | HEART RATE: 75 BPM | HEIGHT: 69 IN | RESPIRATION RATE: 18 BRPM | TEMPERATURE: 99 F | BODY MASS INDEX: 20.73 KG/M2 | OXYGEN SATURATION: 100 %

## 2022-01-22 DIAGNOSIS — R74.01 TRANSAMINITIS: ICD-10-CM

## 2022-01-22 DIAGNOSIS — R20.2 PARESTHESIA OF BILATERAL LEGS: ICD-10-CM

## 2022-01-22 DIAGNOSIS — F41.9 ANXIETY: ICD-10-CM

## 2022-01-22 DIAGNOSIS — R07.89 ATYPICAL CHEST PAIN: Primary | ICD-10-CM

## 2022-01-22 LAB
ALBUMIN SERPL BCP-MCNC: 4.2 G/DL (ref 3.5–5.2)
ALP SERPL-CCNC: 70 U/L (ref 55–135)
ALT SERPL W/O P-5'-P-CCNC: 102 U/L (ref 10–44)
ANION GAP SERPL CALC-SCNC: 13 MMOL/L (ref 8–16)
AST SERPL-CCNC: 175 U/L (ref 10–40)
B-HCG UR QL: NEGATIVE
BASOPHILS # BLD AUTO: 0.05 K/UL (ref 0–0.2)
BASOPHILS NFR BLD: 1.6 % (ref 0–1.9)
BILIRUB SERPL-MCNC: 2.9 MG/DL (ref 0.1–1)
BNP SERPL-MCNC: <10 PG/ML (ref 0–99)
BUN SERPL-MCNC: 5 MG/DL (ref 6–20)
CALCIUM SERPL-MCNC: 9.8 MG/DL (ref 8.7–10.5)
CHLORIDE SERPL-SCNC: 97 MMOL/L (ref 95–110)
CO2 SERPL-SCNC: 28 MMOL/L (ref 23–29)
CREAT SERPL-MCNC: 0.7 MG/DL (ref 0.5–1.4)
CTP QC/QA: YES
CTP QC/QA: YES
D DIMER PPP IA.FEU-MCNC: 0.41 MG/L FEU
DIFFERENTIAL METHOD: ABNORMAL
EOSINOPHIL # BLD AUTO: 0 K/UL (ref 0–0.5)
EOSINOPHIL NFR BLD: 1.3 % (ref 0–8)
ERYTHROCYTE [DISTWIDTH] IN BLOOD BY AUTOMATED COUNT: 11.1 % (ref 11.5–14.5)
EST. GFR  (AFRICAN AMERICAN): >60 ML/MIN/1.73 M^2
EST. GFR  (NON AFRICAN AMERICAN): >60 ML/MIN/1.73 M^2
GLUCOSE SERPL-MCNC: 101 MG/DL (ref 70–110)
HCT VFR BLD AUTO: 40.7 % (ref 37–48.5)
HGB BLD-MCNC: 13.7 G/DL (ref 12–16)
IMM GRANULOCYTES # BLD AUTO: 0.01 K/UL (ref 0–0.04)
IMM GRANULOCYTES NFR BLD AUTO: 0.3 % (ref 0–0.5)
LIPASE SERPL-CCNC: 22 U/L (ref 4–60)
LYMPHOCYTES # BLD AUTO: 0.9 K/UL (ref 1–4.8)
LYMPHOCYTES NFR BLD: 29.6 % (ref 18–48)
MAGNESIUM SERPL-MCNC: 1.5 MG/DL (ref 1.6–2.6)
MCH RBC QN AUTO: 33.6 PG (ref 27–31)
MCHC RBC AUTO-ENTMCNC: 33.7 G/DL (ref 32–36)
MCV RBC AUTO: 100 FL (ref 82–98)
MONOCYTES # BLD AUTO: 0.3 K/UL (ref 0.3–1)
MONOCYTES NFR BLD: 10.6 % (ref 4–15)
NEUTROPHILS # BLD AUTO: 1.8 K/UL (ref 1.8–7.7)
NEUTROPHILS NFR BLD: 56.6 % (ref 38–73)
NRBC BLD-RTO: 0 /100 WBC
PLATELET # BLD AUTO: 163 K/UL (ref 150–450)
PMV BLD AUTO: 10.5 FL (ref 9.2–12.9)
POTASSIUM SERPL-SCNC: 4.3 MMOL/L (ref 3.5–5.1)
PROT SERPL-MCNC: 7.5 G/DL (ref 6–8.4)
RBC # BLD AUTO: 4.08 M/UL (ref 4–5.4)
SARS-COV-2 RDRP RESP QL NAA+PROBE: NEGATIVE
SODIUM SERPL-SCNC: 138 MMOL/L (ref 136–145)
TROPONIN I SERPL DL<=0.01 NG/ML-MCNC: 0.01 NG/ML (ref 0–0.03)
TSH SERPL DL<=0.005 MIU/L-ACNC: 0.74 UIU/ML (ref 0.4–4)
WBC # BLD AUTO: 3.11 K/UL (ref 3.9–12.7)

## 2022-01-22 PROCEDURE — 93010 ELECTROCARDIOGRAM REPORT: CPT | Mod: ,,, | Performed by: INTERNAL MEDICINE

## 2022-01-22 PROCEDURE — 93010 EKG 12-LEAD: ICD-10-PCS | Mod: ,,, | Performed by: INTERNAL MEDICINE

## 2022-01-22 PROCEDURE — 93005 ELECTROCARDIOGRAM TRACING: CPT

## 2022-01-22 PROCEDURE — U0002 COVID-19 LAB TEST NON-CDC: HCPCS | Performed by: EMERGENCY MEDICINE

## 2022-01-22 PROCEDURE — 85025 COMPLETE CBC W/AUTO DIFF WBC: CPT | Performed by: EMERGENCY MEDICINE

## 2022-01-22 PROCEDURE — 81025 URINE PREGNANCY TEST: CPT | Performed by: EMERGENCY MEDICINE

## 2022-01-22 PROCEDURE — 84443 ASSAY THYROID STIM HORMONE: CPT | Performed by: EMERGENCY MEDICINE

## 2022-01-22 PROCEDURE — 84484 ASSAY OF TROPONIN QUANT: CPT | Performed by: EMERGENCY MEDICINE

## 2022-01-22 PROCEDURE — 83880 ASSAY OF NATRIURETIC PEPTIDE: CPT | Performed by: EMERGENCY MEDICINE

## 2022-01-22 PROCEDURE — 83735 ASSAY OF MAGNESIUM: CPT | Performed by: EMERGENCY MEDICINE

## 2022-01-22 PROCEDURE — 99285 EMERGENCY DEPT VISIT HI MDM: CPT | Mod: 25

## 2022-01-22 PROCEDURE — 36000 PLACE NEEDLE IN VEIN: CPT

## 2022-01-22 PROCEDURE — 85379 FIBRIN DEGRADATION QUANT: CPT | Performed by: EMERGENCY MEDICINE

## 2022-01-22 PROCEDURE — 83690 ASSAY OF LIPASE: CPT | Performed by: EMERGENCY MEDICINE

## 2022-01-22 PROCEDURE — 80053 COMPREHEN METABOLIC PANEL: CPT | Performed by: EMERGENCY MEDICINE

## 2022-01-22 RX ORDER — HYDROXYZINE PAMOATE 25 MG/1
25 CAPSULE ORAL EVERY 6 HOURS PRN
Qty: 25 CAPSULE | Refills: 0 | Status: SHIPPED | OUTPATIENT
Start: 2022-01-22

## 2022-01-22 NOTE — ED PROVIDER NOTES
"Encounter Date: 2022    SCRIBE #1 NOTE: I, Rivera Lopez, am scribing for, and in the presence of, Dr. Monique .       History     Chief Complaint   Patient presents with    multiple complaints     Pt reports unexplained weight loss over the last month, a "fast heart rate, nausea, and weekness x3 days, with new onset, intermittent chest pain today     Time seen by provider: 2:34 PM    This is a 41 y.o. female with a history of anxiety who presents with complaint of chest pain onset today. The patient reports dull, intermittent, left sided pain that is non-radiating. She had 1 episode that occurred while napping. She states having an elevated heart rate, recurrent nausea the past few mornings, and SOB with inspiration. She has a slight cough but denies any fever or chills. She denies any chest pain when working. The patient reports constant bilateral leg numbness, left > right, that improves with movement. She describes feeling like she "got shot with novacane." She also has numbness in both hands when at rest. She denies any weakness, incontinence, back pain, or headache. She has a history of femoral block on left from a ligament repair when she was 24. No FMHx of fibromyalgia or myasthenia gravis. No other complaints at this time.     The history is provided by the patient.     Review of patient's allergies indicates:  No Known Allergies  Past Medical History:   Diagnosis Date    Anxiety     Elevated liver enzymes     secondary to alcohol use    Panic attack 2020    Patient states that this was due to exhaustion after having difficulty falling asleep due to anxiety     Past Surgical History:   Procedure Laterality Date    ANTERIOR CRUCIATE LIGAMENT REPAIR      age 24     SECTION N/A 7/10/2020    Procedure:  SECTION;  Surgeon: Rosalba Loyd MD;  Location: [facility] L[facility];  Service: OB/GYN;  Laterality: N/A;     Family History   Problem Relation Age of Onset    Obesity Father     " Thyroid cancer Sister         alive and well    Ovarian cancer Neg Hx     Colon cancer Neg Hx     Breast cancer Neg Hx     Arrhythmia Neg Hx     Cardiomyopathy Neg Hx     Congenital heart disease Neg Hx     Heart attacks under age 50 Neg Hx     Hypertension Neg Hx     Pacemaker/defibrilator Neg Hx      Social History     Tobacco Use    Smoking status: Former Smoker     Types: Cigarettes    Tobacco comment: social smoker 2 cigs/ week quit in 2018   Substance Use Topics    Alcohol use: Not Currently     Comment: possible ETOH during early pregancy (didn't know was pregnant)    Drug use: Not Currently     Types: Cocaine, Marijuana, MDMA (Ecstacy), LSD     Comment: no recent drug use, in past used above substances     Review of Systems   Constitutional: Negative for chills and fever.   HENT: Negative for sore throat.    Respiratory: Positive for cough and shortness of breath.    Cardiovascular: Positive for chest pain.   Gastrointestinal: Positive for nausea. Negative for vomiting.   Genitourinary: Negative for dysuria.        No incontinence.    Musculoskeletal: Negative for back pain.   Skin: Negative for rash.   Neurological: Positive for numbness. Negative for weakness and headaches.   Hematological: Does not bruise/bleed easily.   All other systems reviewed and are negative.      Physical Exam     Initial Vitals [01/22/22 1400]   BP Pulse Resp Temp SpO2   (!) 147/90 102 18 98.6 °F (37 °C) 98 %      MAP       --         Physical Exam    Nursing note and vitals reviewed.  Constitutional: She appears well-developed and well-nourished. She is not diaphoretic. No distress.   HENT:   Head: Normocephalic and atraumatic.   Right Ear: External ear normal.   Left Ear: External ear normal.   Eyes: Conjunctivae and EOM are normal. Pupils are equal, round, and reactive to light.   Neck: Neck supple. No tracheal deviation present.   Normal range of motion.  Cardiovascular: Normal rate, regular rhythm, normal heart  sounds and intact distal pulses. Exam reveals no gallop and no friction rub.    No murmur heard.  Pulmonary/Chest: Breath sounds normal. No respiratory distress. She has no wheezes. She has no rhonchi. She has no rales. She exhibits no tenderness.   Abdominal: Abdomen is soft. Bowel sounds are normal. She exhibits no distension and no mass. There is no abdominal tenderness. There is no rebound and no guarding.   Musculoskeletal:         General: Normal range of motion.      Cervical back: Normal range of motion and neck supple.     Neurological: She is alert and oriented to person, place, and time. She has normal strength and normal reflexes. She displays normal reflexes. No cranial nerve deficit or sensory deficit. GCS score is 15. GCS eye subscore is 4. GCS verbal subscore is 5. GCS motor subscore is 6.   Skin: Skin is warm and dry. Capillary refill takes less than 2 seconds.   Psychiatric:   Anxious, cooperative         ED Course   Procedures  Labs Reviewed   CBC W/ AUTO DIFFERENTIAL - Abnormal; Notable for the following components:       Result Value    WBC 3.11 (*)      (*)     MCH 33.6 (*)     RDW 11.1 (*)     Lymph # 0.9 (*)     All other components within normal limits   COMPREHENSIVE METABOLIC PANEL - Abnormal; Notable for the following components:    BUN 5 (*)     Total Bilirubin 2.9 (*)      (*)      (*)     All other components within normal limits   MAGNESIUM - Abnormal; Notable for the following components:    Magnesium 1.5 (*)     All other components within normal limits   B-TYPE NATRIURETIC PEPTIDE   TROPONIN I   D DIMER, QUANTITATIVE   TSH   LIPASE   LIPASE   POCT URINE PREGNANCY   SARS-COV-2 RDRP GENE     EKG Readings: (Independently Interpreted)   NSR with a rate of 77. No STEMI. Nonspecific T wave changes.        Imaging Results          US Abdomen Limited (Final result)  Result time 01/22/22 18:59:55    Final result by Krisahn Sarkar MD (01/22/22 18:59:55)                  Impression:      No findings to suggest acute cholecystitis.    Increased hepatic echogenicity suggesting steatosis or other infiltrative process, correlation with LFTs advised.      Electronically signed by: Krishan Sarkar MD  Date:    01/22/2022  Time:    18:59             Narrative:    EXAMINATION:  US ABDOMEN LIMITED    CLINICAL HISTORY:  elevated bili and transaminases;    TECHNIQUE:  Limited ultrasound of the right upper quadrant of the abdomen (including pancreas, liver, gallbladder, common bile duct, and spleen) was performed.    COMPARISON:  None.    FINDINGS:  The visualized portions of the pancreas are unremarkable.  The gallbladder is nondistended.  No gallbladder wall hyperemia or gallbladder wall thickening.  No pericholecystic fluid.  Sonographic Yi sign is negative.  The common duct is not dilated measuring 0.2 cm.  The hepatic parenchyma is echogenic suggesting steatosis.  The liver is not enlarged.  The visualized portions of the right kidney are unremarkable.  The spleen is unremarkable.  No ascites.                               CT Head Without Contrast (Final result)  Result time 01/22/22 15:55:33    Final result by Milton Pedraza MD (01/22/22 15:55:33)                 Impression:      No acute intracranial process.  Additional evaluation, as clinically warranted.      Electronically signed by: Milton Pedraza MD  Date:    01/22/2022  Time:    15:55             Narrative:    EXAMINATION:  CT HEAD WITHOUT CONTRAST    CLINICAL HISTORY:  Neuro deficit, acute, stroke suspected;    TECHNIQUE:  Low dose axial images were obtained through the head.  Coronal and sagittal reformations were also performed. Contrast was not administered.    COMPARISON:  None.    FINDINGS:  The subcutaneous tissues are unremarkable.  The bony calvarium is intact.  The paranasal sinuses unremarkable.  The mastoid air cells are clear.  The orbits and intraorbital contents are within normal limits.    The craniocervical  junction is intact.  The midline structures are unremarkable.  There are no extra-axial fluid collections.  There is no evidence of intracranial hemorrhage.  The ventricles and sulci are within normal limits.  The cisterns are unremarkable.  The gray-white differentiation is maintained.  There is an old lacunar type infarction in the left basal ganglia.  There is no dense vessel sign.  There is no evidence of mass effect.                               X-Ray Chest AP Portable (Final result)  Result time 01/22/22 15:23:44    Final result by Krishan Sarkar MD (01/22/22 15:23:44)                 Impression:      1. No acute cardiopulmonary process.      Electronically signed by: Krishan Sarkar MD  Date:    01/22/2022  Time:    15:23             Narrative:    EXAMINATION:  XR CHEST AP PORTABLE    CLINICAL HISTORY:  Chest Pain;    TECHNIQUE:  Single frontal view of the chest was performed.    COMPARISON:  None    FINDINGS:  The cardiomediastinal silhouette is not enlarged.  There is no pleural effusion.  The trachea is midline.  The lungs are symmetrically expanded bilaterally without evidence of acute parenchymal process. No large focal consolidation seen.  There is no pneumothorax.  The osseous structures are unremarkable.                              X-Rays:   Independently Interpreted Readings:   Chest X-Ray: Normal cardiac shadow, no focal infiltrates, no pleural effusion.      Medications - No data to display  Medical Decision Making:   History:   Old Medical Records: I decided to obtain old medical records.  Differential Diagnosis:   ICH, TIA, meningitis, cerebral venous sinus thrombosis, brain tumor,  Thyroid disease, myasthenia gravis, fibromyalgia, Cauda equina syndrome, diskitis/osteomyelitis, epidural/paraspinal abscess, AAA, aortic dissection, spinal cord injury, disc herniation, spinal stenosis, sciatica, radiculopathy, neoplasm, lumbar muscle strain, muscle spasm, neuropathic pain, UTI/pyelonephritis,  nephrolithiasis, fracture, dislocation, ligamentous injury, tenderness injury, neurovascular injury, muscular tear, rhabdomyolysis, compartment syndrome    Independently Interpreted Test(s):   I have ordered and independently interpreted X-rays - see prior notes.  I have ordered and independently interpreted EKG Reading(s) - see prior notes  Clinical Tests:   Lab Tests: Ordered and Reviewed  Radiological Study: Ordered and Reviewed  Medical Tests: Reviewed and Ordered  ED Management:  Patient  With history of anxiety presented with atypical chest pain, elevated heart rate bilateral lower extremity and upper extremity numbness and had a cardiopulmonary workup without was without any remarkable findings leading me to suspect that there is a large anxiety component to her symptoms.  She reports that she has been increasingly stressed lately and she recently quit her job.  Normally anxiety and stress would not explain the persistent bilateral lower extremity weakness, but given that she has normal exam, no concerning features of bilateral weakness, bowel/bladder incontinence, significant new motor/sensory deficits, or saddle anesthesia I do not suspect acute cauda equina syndrome. On physical exam, there is no focal midline tenderness or evidence of significant trauma to suggest fracture or injury. There is no fever, immunocompromise, history of recent surgery, or erythema/fluctuance to suggest epidural hematoma, infection, or abscess.  She did have elevated bilirubin, AST, ALT and MCV coupled with fatty liver change  Is concerning for excessive alcohol use.  Patient admits that due to stress and anxiety she has had increasing alcohol use, but denies any withdrawals symptoms.  Discussed with patient the need to stop alcohol use and follow up with her PCP. I feel it is safe and appropriate at this time for the patient to be discharged for follow up and re-evaluation as detailed in the discharge instructions. No further  workup indicated based on their complaints or examination today. Discussed results with the patient. I educated the patient/guardian on the warning signs and symptoms for which they must seek immediate medical attention. All questions addressed and patient/guardian were given discharge instructions and followup information.     Management decisions for this encounter made during a severe acute wave of the COVID-19 public health emergency. Available resources, standards for appropriate emergency department evaluation, and admission vs. discharge standards have necessarily shifted and remain dynamic.     Note was created using voice recognition software. It may have occasional typographical errors not identified and edited despite initial review prior to signing.              Scribe Attestation:   Scribe #1: I performed the above scribed service and the documentation accurately describes the services I performed. I attest to the accuracy of the note.               Physician Attestation for scribe, I MAA, reviewed documentation as scribed in my presence, which is both accurate and complete.    Clinical Impression:     1. Atypical chest pain    2. Paresthesia of bilateral legs    3. Transaminitis    4. Anxiety           ED Disposition Condition    Discharge Stable        ED Prescriptions     Medication Sig Dispense Start Date End Date Auth. Provider    hydrOXYzine pamoate (VISTARIL) 25 MG Cap Take 1 capsule (25 mg total) by mouth every 6 (six) hours as needed (Anxiety). 25 capsule 1/22/2022  Leandro Monique MD        Follow-up Information     Follow up With Specialties Details Why Contact Info    Daniella Lyons MD Family Medicine Schedule an appointment as soon as possible for a visit in 3 days For follow-up and re-evaluation [address] [address]  [phone]      [facility] Emergency Dept Emergency Medicine  As needed, for any new or worsening symptoms [facility]  [place] [address]  [phone]           Leandro Monique MD  01/22/22 1947

## 2022-01-22 NOTE — ED TRIAGE NOTES
Pt presents to ED with multiple complaints. Pt c/o left anterior intermittent CP x1 day. Pt also reports her heart is beating fast, nausea, weakness x3 days. Pt reports decreased appetite. Pt denies SOB, cough, fever, chills. Pt AAOx4, respirations even and unlabored.

## 2024-05-22 ENCOUNTER — HOSPITAL ENCOUNTER (EMERGENCY)
Facility: HOSPITAL | Age: 44
Discharge: HOME | End: 2024-05-22
Attending: STUDENT IN AN ORGANIZED HEALTH CARE EDUCATION/TRAINING PROGRAM
Payer: MEDICAID

## 2024-05-22 VITALS
WEIGHT: 135 LBS | OXYGEN SATURATION: 98 % | RESPIRATION RATE: 16 BRPM | HEART RATE: 90 BPM | DIASTOLIC BLOOD PRESSURE: 77 MMHG | TEMPERATURE: 98 F | HEIGHT: 70 IN | BODY MASS INDEX: 19.33 KG/M2 | SYSTOLIC BLOOD PRESSURE: 138 MMHG

## 2024-05-22 DIAGNOSIS — K04.7 DENTAL ABSCESS: Primary | ICD-10-CM

## 2024-05-22 DIAGNOSIS — R51.9 HEADACHE: ICD-10-CM
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
## 2024-05-22 LAB
AMORPH CRY UR QL COMP ASSIST: ABNORMAL
B-HCG UR QL: NEGATIVE
BACTERIA #/AREA URNS AUTO: ABNORMAL /HPF
BILIRUB UR QL STRIP: NEGATIVE
CLARITY UR REFRACT.AUTO: ABNORMAL
COLOR UR AUTO: YELLOW
CTP QC/QA: YES
GLUCOSE UR QL STRIP: NEGATIVE
HGB UR QL STRIP: NEGATIVE
HYALINE CASTS UR QL AUTO: 0 /LPF
KETONES UR QL STRIP: ABNORMAL
LEUKOCYTE ESTERASE UR QL STRIP: ABNORMAL
MICROSCOPIC COMMENT: ABNORMAL
NITRITE UR QL STRIP: POSITIVE
OHS QRS DURATION: 78 MS
OHS QTC CALCULATION: 442 MS
PH UR STRIP: 6 [PH] (ref 5–8)
POCT GLUCOSE: 104 MG/DL (ref 70–110)
PROT UR QL STRIP: ABNORMAL
RBC #/AREA URNS AUTO: 3 /HPF (ref 0–4)
SP GR UR STRIP: 1.03 (ref 1–1.03)
SQUAMOUS #/AREA URNS AUTO: 4 /HPF
URN SPEC COLLECT METH UR: ABNORMAL
WBC #/AREA URNS AUTO: 6 /HPF (ref 0–5)

## 2024-05-22 PROCEDURE — 82962 GLUCOSE BLOOD TEST: CPT

## 2024-05-22 PROCEDURE — 25000003 PHARM REV CODE 250: Performed by: NURSE PRACTITIONER

## 2024-05-22 PROCEDURE — 96365 THER/PROPH/DIAG IV INF INIT: CPT

## 2024-05-22 PROCEDURE — 99284 EMERGENCY DEPT VISIT MOD MDM: CPT | Mod: 25

## 2024-05-22 PROCEDURE — 93005 ELECTROCARDIOGRAM TRACING: CPT

## 2024-05-22 PROCEDURE — 81025 URINE PREGNANCY TEST: CPT | Performed by: NURSE PRACTITIONER

## 2024-05-22 PROCEDURE — 63600175 PHARM REV CODE 636 W HCPCS: Mod: JZ,JG | Performed by: NURSE PRACTITIONER

## 2024-05-22 PROCEDURE — 81001 URINALYSIS AUTO W/SCOPE: CPT | Performed by: NURSE PRACTITIONER

## 2024-05-22 PROCEDURE — 93010 ELECTROCARDIOGRAM REPORT: CPT | Mod: ,,, | Performed by: INTERNAL MEDICINE

## 2024-05-22 RX ORDER — DIPHENHYDRAMINE HYDROCHLORIDE 50 MG/ML
25 INJECTION INTRAMUSCULAR; INTRAVENOUS ONCE
Status: DISCONTINUED | OUTPATIENT
Start: 2024-05-22 | End: 2024-05-22

## 2024-05-22 RX ORDER — CLINDAMYCIN PHOSPHATE 600 MG/50ML
600 INJECTION, SOLUTION INTRAVENOUS
Status: COMPLETED | OUTPATIENT
Start: 2024-05-22 | End: 2024-05-22

## 2024-05-22 RX ORDER — IBUPROFEN 800 MG/1
800 TABLET ORAL EVERY 6 HOURS PRN
Qty: 20 TABLET | Refills: 0 | Status: SHIPPED | OUTPATIENT
Start: 2024-05-22

## 2024-05-22 RX ORDER — CHLORHEXIDINE GLUCONATE ORAL RINSE 1.2 MG/ML
15 SOLUTION DENTAL 2 TIMES DAILY
Qty: 420 ML | Refills: 0 | Status: SHIPPED | OUTPATIENT
Start: 2024-05-22 | End: 2024-06-05

## 2024-05-22 RX ORDER — AMOXICILLIN AND CLAVULANATE POTASSIUM 875; 125 MG/1; MG/1
1 TABLET, FILM COATED ORAL 2 TIMES DAILY
Qty: 14 TABLET | Refills: 0 | Status: SHIPPED | OUTPATIENT
Start: 2024-05-22

## 2024-05-22 RX ORDER — TRAMADOL HYDROCHLORIDE 50 MG/1
50 TABLET ORAL
Status: COMPLETED | OUTPATIENT
Start: 2024-05-22 | End: 2024-05-22

## 2024-05-22 RX ADMIN — TRAMADOL HYDROCHLORIDE 50 MG: 50 TABLET, COATED ORAL at 06:05

## 2024-05-22 RX ADMIN — CLINDAMYCIN PHOSPHATE 600 MG: 600 INJECTION, SOLUTION INTRAVENOUS at 05:05

## 2024-05-22 NOTE — ED TRIAGE NOTES
Sonal Simms, a 43 y.o. female presents to the ED w/ complaint of dizziness and facial pain. Had a fall and passed out, broke teeth in fall, had root canal after that, issues with memory, dizziness, and pain    Triage note:  Chief Complaint   Patient presents with    Headache    Dizziness     Pt comes from home. Pt had two root canals and fell over a week ago. Pt has been experiencing worsening dizziness and HA ever since. EMS administered Toradol.      Review of patient's allergies indicates:  No Known Allergies  Past Medical History:   Diagnosis Date    Anxiety     Elevated liver enzymes     secondary to alcohol use    Panic attack 01/26/2020    Patient states that this was due to exhaustion after having difficulty falling asleep due to anxiety         APPEARANCE: awake and alert in NAD. PAIN  8/10  SKIN: warm, dry and intact. No breakdown or bruising.  MUSCULOSKELETAL: Patient moving all extremities spontaneously, no obvious swelling or deformities noted. Ambulates independently.  RESPIRATORY: Denies shortness of breath.Respirations unlabored.   CARDIAC: Denies CP, 2+ distal pulses; no peripheral edema  ABDOMEN: S/ND/NT, endorses nausea  : voids spontaneously, denies difficulty  Neurologic: AAO x 4; follows commands equal strength in all extremities; denies numbness/tingling. endorses dizziness

## 2024-05-23 NOTE — ED NOTES
Patient reports weakness, and asked for orange juice, medical team made aware, sugar checked on patient to be 104. Orange juice provided to patient.

## 2024-05-23 NOTE — ED PROVIDER NOTES
Encounter Date: 2024       History     Chief Complaint   Patient presents with    Headache    Dizziness     Pt comes from home. Pt had two root canals and fell over a week ago. Pt has been experiencing worsening dizziness and HA ever since. EMS administered Toradol.       43-year-old female which presents to the emergency room with left-sided facial pain after a fall last Thursday and a root canal done on Saturday after she broke 2 teeth during the fall.  Patient states since then she has had worsening pain.  She is concerned that she has something broken..    The history is provided by the patient.     Review of patient's allergies indicates:  No Known Allergies  Past Medical History:   Diagnosis Date    Anxiety     Elevated liver enzymes     secondary to alcohol use    Panic attack 2020    Patient states that this was due to exhaustion after having difficulty falling asleep due to anxiety     Past Surgical History:   Procedure Laterality Date    ANTERIOR CRUCIATE LIGAMENT REPAIR      age 24     SECTION N/A 7/10/2020    Procedure:  SECTION;  Surgeon: Rosalba Loyd MD;  Location: [facility] L&D;  Service: OB/GYN;  Laterality: N/A;     Family History   Problem Relation Name Age of Onset    Obesity Father      Thyroid cancer Sister          alive and well    Ovarian cancer Neg Hx      Colon cancer Neg Hx      Breast cancer Neg Hx      Arrhythmia Neg Hx      Cardiomyopathy Neg Hx      Congenital heart disease Neg Hx      Heart attacks under age 50 Neg Hx      Hypertension Neg Hx      Pacemaker/defibrilator Neg Hx       Social History     Tobacco Use    Smoking status: Former     Types: Cigarettes    Tobacco comments:     social smoker 2 cigs/ week quit in 2018   Substance Use Topics    Alcohol use: Not Currently     Comment: possible ETOH during early pregancy (didn't know was pregnant)    Drug use: Not Currently     Types: Cocaine, Marijuana, MDMA (Ecstacy), LSD     Comment: no recent drug  use, in past used above substances     Review of Systems   Constitutional:  Negative for fever.   HENT:  Negative for sore throat.    Respiratory:  Negative for shortness of breath.    Cardiovascular:  Negative for chest pain.   Gastrointestinal:  Negative for nausea.   Genitourinary:  Negative for dysuria.   Musculoskeletal:  Negative for back pain.   Skin:  Negative for rash.   Neurological:  Negative for weakness.   Hematological:  Does not bruise/bleed easily.   All other systems reviewed and are negative.    Physical Exam     Initial Vitals [05/22/24 1537]   BP Pulse Resp Temp SpO2   (!) 140/80 100 12 99.4 °F (37.4 °C) 98 %      MAP       --         Physical Exam    Nursing note and vitals reviewed.  Constitutional: She appears well-developed and well-nourished.   HENT:   Head: Normocephalic and atraumatic.   Mouth/Throat: Uvula is midline. Abnormal dentition.   Gingiva swelling and erythema noted to the left lower posterior molar region- no obvious swelling- no trismus   Eyes: Conjunctivae and EOM are normal. Pupils are equal, round, and reactive to light.   Neck:   Normal range of motion.  Cardiovascular:  Normal rate, regular rhythm, normal heart sounds and intact distal pulses.     Exam reveals no gallop and no friction rub.       No murmur heard.  Pulmonary/Chest: Breath sounds normal. No respiratory distress. She has no wheezes. She has no rhonchi. She has no rales. She exhibits no tenderness.   Musculoskeletal:         General: No tenderness or edema. Normal range of motion.      Cervical back: Normal range of motion.     Neurological: She is alert and oriented to person, place, and time. She has normal strength. GCS score is 15. GCS eye subscore is 4. GCS verbal subscore is 5. GCS motor subscore is 6.   Skin: Skin is warm. Capillary refill takes less than 2 seconds. No rash noted. No erythema.   Psychiatric: She has a normal mood and affect.         ED Course   Procedures  Labs Reviewed   URINALYSIS,  REFLEX TO URINE CULTURE - Abnormal; Notable for the following components:       Result Value    Appearance, UA Cloudy (*)     Protein, UA 1+ (*)     Ketones, UA 2+ (*)     Nitrite, UA Positive (*)     Leukocytes, UA Trace (*)     All other components within normal limits    Narrative:     Specimen Source->Urine   URINALYSIS MICROSCOPIC - Abnormal; Notable for the following components:    WBC, UA 6 (*)     Bacteria Many (*)     Amorphous, UA Many (*)     All other components within normal limits    Narrative:     Specimen Source->Urine   POCT URINE PREGNANCY   POCT GLUCOSE     EKG Readings: (Independently Interpreted)   Initial Reading: No STEMI. Rhythm: Normal Sinus Rhythm. Heart Rate: 75. Ectopy: No Ectopy. Clinical Impression: Normal Sinus Rhythm     ECG Results              EKG 12-lead (Final result)        Collection Time Result Time QRS Duration OHS QTC Calculation    05/22/24 15:45:10 05/22/24 16:36:41 78 442                     Final result by Interface, Lab In [facility] (05/22/24 16:36:46)                   Narrative:    Test Reason : R51.9,    Vent. Rate : 075 BPM     Atrial Rate : 075 BPM     P-R Int : 144 ms          QRS Dur : 078 ms      QT Int : 396 ms       P-R-T Axes : 070 070 086 degrees     QTc Int : 442 ms    Normal sinus rhythm  Normal ECG  When compared with ECG of 22-JAN-2022 14:50,  No significant change was found  Confirmed by Marifer Eid MD ([id number]) on 5/22/2024 4:36:41 PM    Referred By:             Confirmed By:Marifer Eid MD                                  Imaging Results              X-Ray Mandible More Than 4 Views (Final result)  Result time 05/22/24 19:42:32      Final result by Yonatan Navarro MD (05/22/24 19:42:32)                   Impression:      Negative mandibular series for acute fracture..      Electronically signed by: Yonatan Navarro  Date:    05/22/2024  Time:    19:42               Narrative:    EXAMINATION:  XR MANDIBLE MORE THAN 4 VIEWS    CLINICAL  HISTORY:  Unspecified fall, initial encounter    TECHNIQUE:  PA, Acacia's and oblique views of the mandible.    COMPARISON:  None    FINDINGS:  Right left aby mandible appear intact.  TMJ evaluation limited but no evidence of fracture.  Surrounding bones and soft tissues unremarkable.  Permanent lower arch orthodontic retainer.  Lower right mandibular arch molar crown caries.                                       Medications   clindamycin in D5W 600 mg/50 mL IVPB 600 mg (0 mg Intravenous Stopped 5/22/24 1806)   traMADoL tablet 50 mg (50 mg Oral Given 5/22/24 1809)     Medical Decision Making  42 y/o female which presents with concerns that her jaw is broken after a fall. Mandibular xray is negative. Pt does have early signs of a dental abscess. She was given clindamycin IV in the ED due to several dental caries and gingivitis/abscess. She was discharged with Augmentin to cover the dental abscess and UTI noted on urinalysis. Patient given strict return precautions and voiced understanding of all discharge instructions. Pt was stable at discharge.       Differential Diagnosis: dental abscess, gingivitis, jaw pain    Problems Addressed:  Dental abscess: acute illness or injury  Fall: acute illness or injury  Headache: acute illness or injury    Amount and/or Complexity of Data Reviewed  Labs: ordered. Decision-making details documented in ED Course.  Radiology: ordered.    Risk  Prescription drug management.               ED Course as of 05/23/24 2129   Wed May 22, 2024   1641 BP(!): 140/80 [AT]   1641 Temp: 99.4 °F (37.4 °C) [AT]   1641 Temp Source: Oral [AT]   1641 Pulse: 100 [AT]   1641 Resp: 12 [AT]   1641 SpO2: 98 % [AT]   1747 Bacteria, UA(!): Many [AT]   1747 Amorphous, UA(!): Many [AT]   1747 NITRITE UA(!): Positive [AT]      ED Course User Index  [AT] Patricia Webster FNP                           Clinical Impression:  Final diagnoses:  [R51.9] Headache  [W19.XXXA] Fall  [K04.7] Dental abscess (Primary)           ED Disposition Condition    Discharge Stable          ED Prescriptions       Medication Sig Dispense Start Date End Date Auth. Provider    amoxicillin-clavulanate 875-125mg (AUGMENTIN) 875-125 mg per tablet Take 1 tablet by mouth 2 (two) times daily. 14 tablet 5/22/2024 -- Patricia Webster FNP    chlorhexidine (PERIDEX) 0.12 % solution Use as directed 15 mLs in the mouth or throat 2 (two) times daily. for 14 days 420 mL 5/22/2024 6/5/2024 Patricia Webster FNP    ibuprofen (ADVIL,MOTRIN) 800 MG tablet Take 1 tablet (800 mg total) by mouth every 6 (six) hours as needed for Pain. 20 tablet 5/22/2024 -- Patricia Webster FNP          Follow-up Information    None          Patricia Webster FNP  05/23/24 6742

## 2024-10-30 ENCOUNTER — OFFICE VISIT (OUTPATIENT)
Dept: URGENT CARE | Facility: CLINIC | Age: 44
End: 2024-10-30
Payer: COMMERCIAL

## 2024-10-30 VITALS
OXYGEN SATURATION: 99 % | HEIGHT: 68 IN | DIASTOLIC BLOOD PRESSURE: 77 MMHG | WEIGHT: 140 LBS | RESPIRATION RATE: 20 BRPM | TEMPERATURE: 98 F | BODY MASS INDEX: 21.22 KG/M2 | HEART RATE: 76 BPM | SYSTOLIC BLOOD PRESSURE: 144 MMHG

## 2024-10-30 DIAGNOSIS — Z02.6 ENCOUNTER RELATED TO WORKER'S COMPENSATION CLAIM: Primary | ICD-10-CM

## 2024-10-30 DIAGNOSIS — S53.401A ELBOW SPRAIN, RIGHT, INITIAL ENCOUNTER: ICD-10-CM

## 2024-10-30 DIAGNOSIS — M25.561 ACUTE PAIN OF RIGHT KNEE: ICD-10-CM

## 2024-10-30 DIAGNOSIS — M25.511 ACUTE PAIN OF RIGHT SHOULDER: ICD-10-CM
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
## 2024-10-30 DIAGNOSIS — M25.531 RIGHT WRIST PAIN: ICD-10-CM

## 2024-10-30 PROCEDURE — 73030 X-RAY EXAM OF SHOULDER: CPT | Mod: RT,S$GLB,, | Performed by: RADIOLOGY

## 2024-10-30 PROCEDURE — 73110 X-RAY EXAM OF WRIST: CPT | Mod: RT,S$GLB,, | Performed by: RADIOLOGY

## 2024-10-30 PROCEDURE — 73080 X-RAY EXAM OF ELBOW: CPT | Mod: RT,S$GLB,, | Performed by: RADIOLOGY

## 2024-10-30 RX ORDER — PANTOPRAZOLE SODIUM 40 MG/1
1 TABLET, DELAYED RELEASE ORAL 2 TIMES DAILY
COMMUNITY
Start: 2024-06-09

## 2024-10-31 ENCOUNTER — OFFICE VISIT (OUTPATIENT)
Dept: URGENT CARE | Facility: CLINIC | Age: 44
End: 2024-10-31
Payer: COMMERCIAL

## 2024-10-31 VITALS
TEMPERATURE: 99 F | WEIGHT: 140 LBS | SYSTOLIC BLOOD PRESSURE: 139 MMHG | DIASTOLIC BLOOD PRESSURE: 94 MMHG | HEIGHT: 68 IN | RESPIRATION RATE: 19 BRPM | HEART RATE: 86 BPM | BODY MASS INDEX: 21.22 KG/M2 | OXYGEN SATURATION: 100 %

## 2024-10-31 DIAGNOSIS — S16.1XXA STRAIN OF NECK MUSCLE, INITIAL ENCOUNTER: ICD-10-CM

## 2024-10-31 DIAGNOSIS — S69.91XA INJURY OF RIGHT WRIST, INITIAL ENCOUNTER: ICD-10-CM

## 2024-10-31 DIAGNOSIS — S46.911A STRAIN OF RIGHT SHOULDER, INITIAL ENCOUNTER: Primary | ICD-10-CM
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
## 2024-10-31 DIAGNOSIS — S50.01XA CONTUSION OF RIGHT ELBOW, INITIAL ENCOUNTER: ICD-10-CM

## 2024-10-31 DIAGNOSIS — Z02.6 ENCOUNTER RELATED TO WORKER'S COMPENSATION CLAIM: ICD-10-CM

## 2024-10-31 RX ORDER — DICLOFENAC SODIUM 10 MG/G
2 GEL TOPICAL 4 TIMES DAILY
Qty: 100 G | Refills: 0 | Status: SHIPPED | OUTPATIENT
Start: 2024-10-31

## 2024-11-07 ENCOUNTER — OFFICE VISIT (OUTPATIENT)
Dept: URGENT CARE | Facility: CLINIC | Age: 44
End: 2024-11-07
Payer: COMMERCIAL

## 2024-11-07 VITALS
TEMPERATURE: 99 F | OXYGEN SATURATION: 96 % | SYSTOLIC BLOOD PRESSURE: 121 MMHG | BODY MASS INDEX: 21.22 KG/M2 | HEIGHT: 68 IN | DIASTOLIC BLOOD PRESSURE: 77 MMHG | RESPIRATION RATE: 18 BRPM | WEIGHT: 140 LBS | HEART RATE: 84 BPM

## 2024-11-07 DIAGNOSIS — S69.91XD INJURY OF RIGHT WRIST, SUBSEQUENT ENCOUNTER: ICD-10-CM

## 2024-11-07 DIAGNOSIS — S16.1XXD STRAIN OF NECK MUSCLE, SUBSEQUENT ENCOUNTER: ICD-10-CM

## 2024-11-07 DIAGNOSIS — Z02.6 ENCOUNTER RELATED TO WORKER'S COMPENSATION CLAIM: ICD-10-CM
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
## 2024-11-07 DIAGNOSIS — S63.501D SPRAIN OF RIGHT WRIST, SUBSEQUENT ENCOUNTER: ICD-10-CM

## 2024-11-07 DIAGNOSIS — S50.01XD CONTUSION OF RIGHT ELBOW, SUBSEQUENT ENCOUNTER: ICD-10-CM

## 2024-11-07 DIAGNOSIS — S46.911D STRAIN OF RIGHT SHOULDER, SUBSEQUENT ENCOUNTER: Primary | ICD-10-CM

## 2024-11-07 NOTE — PROGRESS NOTES
"Subjective:      Patient ID: Sonal Simms is a 44 y.o. female.    Vitals:  height is 5' 8" (1.727 m) and weight is 63.5 kg (140 lb). Her oral temperature is 98.5 °F (36.9 °C). Her blood pressure is 121/77 and her pulse is 84. Her respiration is 18 and oxygen saturation is 96%.     Chief Complaint: Work Related Injury and Follow-up    Patient's place of employment - [employer]  Patient's job title -   Date of Injury - 10/24/2024  Body part injured - Right elbow & right shoulder  Current work status per last visit - Still serving & only carrying 10lbs or less.   Improved, same, or worse - Improved  Pain Scale right now (1-10) -   2    Provider Note:   Patient presents for follow-up right shoulder, elbow and wrist injuries.  She reports improvement since last office visit.  Patient is using diclofenac gel with relief.  She is also conducting home exercises.  Patient is working light duty.    Shoulder Pain   The pain is present in the right shoulder and right elbow. This is a recurrent problem. The current episode started 1 to 4 weeks ago. There has been a history of trauma. The problem occurs intermittently. The problem has been gradually improving. The quality of the pain is described as aching. The pain is at a severity of 2/10. The pain is mild. Pertinent negatives include no fever, headaches, inability to bear weight, itching, joint locking, joint swelling, limited range of motion, numbness, stiffness, tingling or visual symptoms. The symptoms are aggravated by activity. She has tried NSAIDS for the symptoms. The treatment provided moderate relief.     Constitution: Positive for activity change. Negative for fever.   Neck: Positive for neck pain.   Musculoskeletal:  Positive for pain, trauma and joint pain. Negative for abnormal ROM of joint.   Skin:  Negative for wound and bruising.   Neurological:  Negative for headaches, numbness and tingling.      Objective:     Physical Exam   Constitutional: She " is cooperative. No distress.   HENT:   Head: Normocephalic and atraumatic.   Ears:   Right Ear: External ear normal.   Left Ear: External ear normal.   Nose: Nose normal.   Eyes: Conjunctivae are normal. No scleral icterus.   Neck: No decreased range of motion present. No pain with movement present.   Cardiovascular: Normal pulses.   Pulmonary/Chest: Effort normal and breath sounds normal.   Abdominal: Normal appearance.   Musculoskeletal:      Right shoulder: She exhibits tenderness. She exhibits normal range of motion, no swelling and no deformity.      Right elbow: She exhibits normal range of motion, no swelling and no effusion. Tenderness found.      Right wrist: She exhibits tenderness. She exhibits normal range of motion, no swelling, no effusion and no deformity.      Cervical back: She exhibits no tenderness.   Neurological: She is alert. She has normal motor skills and normal sensation. She displays no weakness. No sensory deficit.   Skin: Skin is warm and dry. No bruising   Psychiatric: She experiences Normal attention. Her behavior is normal. Mood normal.   Nursing note and vitals reviewed.      Assessment:     1. Strain of right shoulder, subsequent encounter    2. Encounter related to worker's compensation claim    3. Contusion of right elbow, subsequent encounter    4. Injury of right wrist, subsequent encounter    5. Strain of neck muscle, subsequent encounter    6. Sprain of right wrist, subsequent encounter    7. Work related injury        Plan:       Strain of right shoulder, subsequent encounter    Encounter related to worker's compensation claim    Contusion of right elbow, subsequent encounter    Injury of right wrist, subsequent encounter    Strain of neck muscle, subsequent encounter    Sprain of right wrist, subsequent encounter    Work related injury      Patient Instructions: Attention not to aggravate affected area, Daily home exercises/warm soaks      Restrictions: Limited use of right  hand and arm, No lifting/pushing/pulling more than 10 lbs

## 2024-11-07 NOTE — LETTER
[facility] [address] [address]  Phone: [phone]  Fax: [phone]  [facility]: [email or web address]    Pt Name: Sonal Simms  Injury Date: 10/10/2024   Employee ID: [id number] Date of Treatment: 11/07/2024   Company: [email or web address]      Appointment Time: 07:50 AM Arrived: 8:05 AM   Provider: Kenneth Bravo PA-C Time Out: 8:35 AM     Office Treatment:   1. Strain of right shoulder, subsequent encounter    2. Encounter related to worker's compensation claim    3. Contusion of right elbow, subsequent encounter    4. Injury of right wrist, subsequent encounter    5. Strain of neck muscle, subsequent encounter    6. Sprain of right wrist, subsequent encounter    7. Work related injury          Patient Instructions: Attention not to aggravate affected area, Daily home exercises/warm soaks      Restrictions: Limited use of right hand and arm, No lifting/pushing/pulling more than 10 lbs     Return Appointment: 11/14/2024 at 8:30 AM    DAPHNEY

## 2024-11-18 ENCOUNTER — TELEPHONE (OUTPATIENT)
Dept: URGENT CARE | Facility: CLINIC | Age: 44
End: 2024-11-18
Payer: MEDICAID

## 2024-11-18 NOTE — TELEPHONE ENCOUNTER
Called patient in reference to her missed [facility] Appointment and there was no answer, I received the patients voicemail, left a message for the patient and the reason for the call. MONICO